# Patient Record
Sex: FEMALE | Race: BLACK OR AFRICAN AMERICAN | NOT HISPANIC OR LATINO | URBAN - METROPOLITAN AREA
[De-identification: names, ages, dates, MRNs, and addresses within clinical notes are randomized per-mention and may not be internally consistent; named-entity substitution may affect disease eponyms.]

---

## 2022-05-11 ENCOUNTER — INPATIENT (INPATIENT)
Facility: HOSPITAL | Age: 82
LOS: 1 days | Discharge: HOME | End: 2022-05-13
Attending: STUDENT IN AN ORGANIZED HEALTH CARE EDUCATION/TRAINING PROGRAM | Admitting: STUDENT IN AN ORGANIZED HEALTH CARE EDUCATION/TRAINING PROGRAM
Payer: MEDICARE

## 2022-05-11 VITALS
RESPIRATION RATE: 20 BRPM | SYSTOLIC BLOOD PRESSURE: 118 MMHG | DIASTOLIC BLOOD PRESSURE: 67 MMHG | OXYGEN SATURATION: 97 % | HEART RATE: 107 BPM | TEMPERATURE: 98 F

## 2022-05-11 DIAGNOSIS — R55 SYNCOPE AND COLLAPSE: ICD-10-CM

## 2022-05-11 DIAGNOSIS — Z78.9 OTHER SPECIFIED HEALTH STATUS: Chronic | ICD-10-CM

## 2022-05-11 LAB
ALBUMIN SERPL ELPH-MCNC: 3.9 G/DL — SIGNIFICANT CHANGE UP (ref 3.3–5)
ALP SERPL-CCNC: 109 U/L — SIGNIFICANT CHANGE UP (ref 40–120)
ALT FLD-CCNC: 7 U/L — SIGNIFICANT CHANGE UP (ref 4–33)
ANION GAP SERPL CALC-SCNC: 13 MMOL/L — SIGNIFICANT CHANGE UP (ref 7–14)
AST SERPL-CCNC: 19 U/L — SIGNIFICANT CHANGE UP (ref 4–32)
B PERT DNA SPEC QL NAA+PROBE: SIGNIFICANT CHANGE UP
B PERT+PARAPERT DNA PNL SPEC NAA+PROBE: SIGNIFICANT CHANGE UP
BASE EXCESS BLDV CALC-SCNC: -0.8 MMOL/L — SIGNIFICANT CHANGE UP (ref -2–3)
BASE EXCESS BLDV CALC-SCNC: 0.6 MMOL/L — SIGNIFICANT CHANGE UP (ref -2–3)
BASOPHILS # BLD AUTO: 0.03 K/UL — SIGNIFICANT CHANGE UP (ref 0–0.2)
BASOPHILS NFR BLD AUTO: 0.5 % — SIGNIFICANT CHANGE UP (ref 0–2)
BILIRUB SERPL-MCNC: <0.2 MG/DL — SIGNIFICANT CHANGE UP (ref 0.2–1.2)
BLD GP AB SCN SERPL QL: NEGATIVE — SIGNIFICANT CHANGE UP
BLOOD GAS VENOUS COMPREHENSIVE RESULT: SIGNIFICANT CHANGE UP
BLOOD GAS VENOUS COMPREHENSIVE RESULT: SIGNIFICANT CHANGE UP
BORDETELLA PARAPERTUSSIS (RAPRVP): SIGNIFICANT CHANGE UP
BUN SERPL-MCNC: 25 MG/DL — HIGH (ref 7–23)
C PNEUM DNA SPEC QL NAA+PROBE: SIGNIFICANT CHANGE UP
CALCIUM SERPL-MCNC: 9.3 MG/DL — SIGNIFICANT CHANGE UP (ref 8.4–10.5)
CHLORIDE BLDV-SCNC: 105 MMOL/L — SIGNIFICANT CHANGE UP (ref 96–108)
CHLORIDE BLDV-SCNC: 108 MMOL/L — SIGNIFICANT CHANGE UP (ref 96–108)
CHLORIDE SERPL-SCNC: 105 MMOL/L — SIGNIFICANT CHANGE UP (ref 98–107)
CO2 BLDV-SCNC: 27.3 MMOL/L — HIGH (ref 22–26)
CO2 BLDV-SCNC: 29.4 MMOL/L — HIGH (ref 22–26)
CO2 SERPL-SCNC: 25 MMOL/L — SIGNIFICANT CHANGE UP (ref 22–31)
CREAT SERPL-MCNC: 1.54 MG/DL — HIGH (ref 0.5–1.3)
EGFR: 33 ML/MIN/1.73M2 — LOW
EOSINOPHIL # BLD AUTO: 0.18 K/UL — SIGNIFICANT CHANGE UP (ref 0–0.5)
EOSINOPHIL NFR BLD AUTO: 3.1 % — SIGNIFICANT CHANGE UP (ref 0–6)
FLUAV SUBTYP SPEC NAA+PROBE: SIGNIFICANT CHANGE UP
FLUBV RNA SPEC QL NAA+PROBE: SIGNIFICANT CHANGE UP
GAS PNL BLDV: 140 MMOL/L — SIGNIFICANT CHANGE UP (ref 136–145)
GAS PNL BLDV: 141 MMOL/L — SIGNIFICANT CHANGE UP (ref 136–145)
GAS PNL BLDV: SIGNIFICANT CHANGE UP
GLUCOSE BLDV-MCNC: 143 MG/DL — HIGH (ref 70–99)
GLUCOSE BLDV-MCNC: 162 MG/DL — HIGH (ref 70–99)
GLUCOSE SERPL-MCNC: 160 MG/DL — HIGH (ref 70–99)
HADV DNA SPEC QL NAA+PROBE: SIGNIFICANT CHANGE UP
HCO3 BLDV-SCNC: 26 MMOL/L — SIGNIFICANT CHANGE UP (ref 22–29)
HCO3 BLDV-SCNC: 28 MMOL/L — SIGNIFICANT CHANGE UP (ref 22–29)
HCOV 229E RNA SPEC QL NAA+PROBE: SIGNIFICANT CHANGE UP
HCOV HKU1 RNA SPEC QL NAA+PROBE: SIGNIFICANT CHANGE UP
HCOV NL63 RNA SPEC QL NAA+PROBE: SIGNIFICANT CHANGE UP
HCOV OC43 RNA SPEC QL NAA+PROBE: SIGNIFICANT CHANGE UP
HCT VFR BLD CALC: 37.3 % — SIGNIFICANT CHANGE UP (ref 34.5–45)
HCT VFR BLDA CALC: 34 % — LOW (ref 34.5–46.5)
HCT VFR BLDA CALC: 35 % — SIGNIFICANT CHANGE UP (ref 34.5–46.5)
HGB BLD CALC-MCNC: 11.4 G/DL — LOW (ref 11.5–15.5)
HGB BLD CALC-MCNC: 11.5 G/DL — SIGNIFICANT CHANGE UP (ref 11.5–15.5)
HGB BLD-MCNC: 11.3 G/DL — LOW (ref 11.5–15.5)
HMPV RNA SPEC QL NAA+PROBE: SIGNIFICANT CHANGE UP
HPIV1 RNA SPEC QL NAA+PROBE: SIGNIFICANT CHANGE UP
HPIV2 RNA SPEC QL NAA+PROBE: SIGNIFICANT CHANGE UP
HPIV3 RNA SPEC QL NAA+PROBE: SIGNIFICANT CHANGE UP
HPIV4 RNA SPEC QL NAA+PROBE: SIGNIFICANT CHANGE UP
IANC: 3.72 K/UL — SIGNIFICANT CHANGE UP (ref 1.8–7.4)
IMM GRANULOCYTES NFR BLD AUTO: 0.7 % — SIGNIFICANT CHANGE UP (ref 0–1.5)
LACTATE BLDV-MCNC: 2.6 MMOL/L — HIGH (ref 0.5–2)
LACTATE BLDV-MCNC: 4.4 MMOL/L — CRITICAL HIGH (ref 0.5–2)
LYMPHOCYTES # BLD AUTO: 1.62 K/UL — SIGNIFICANT CHANGE UP (ref 1–3.3)
LYMPHOCYTES # BLD AUTO: 27.5 % — SIGNIFICANT CHANGE UP (ref 13–44)
M PNEUMO DNA SPEC QL NAA+PROBE: SIGNIFICANT CHANGE UP
MAGNESIUM SERPL-MCNC: 2.1 MG/DL — SIGNIFICANT CHANGE UP (ref 1.6–2.6)
MCHC RBC-ENTMCNC: 22.1 PG — LOW (ref 27–34)
MCHC RBC-ENTMCNC: 30.3 GM/DL — LOW (ref 32–36)
MCV RBC AUTO: 72.9 FL — LOW (ref 80–100)
MONOCYTES # BLD AUTO: 0.3 K/UL — SIGNIFICANT CHANGE UP (ref 0–0.9)
MONOCYTES NFR BLD AUTO: 5.1 % — SIGNIFICANT CHANGE UP (ref 2–14)
NEUTROPHILS # BLD AUTO: 3.72 K/UL — SIGNIFICANT CHANGE UP (ref 1.8–7.4)
NEUTROPHILS NFR BLD AUTO: 63.1 % — SIGNIFICANT CHANGE UP (ref 43–77)
NRBC # BLD: 0 /100 WBCS — SIGNIFICANT CHANGE UP
NRBC # FLD: 0 K/UL — SIGNIFICANT CHANGE UP
PCO2 BLDV: 50 MMHG — HIGH (ref 39–42)
PCO2 BLDV: 55 MMHG — HIGH (ref 39–42)
PH BLDV: 7.31 — LOW (ref 7.32–7.43)
PH BLDV: 7.32 — SIGNIFICANT CHANGE UP (ref 7.32–7.43)
PLATELET # BLD AUTO: 301 K/UL — SIGNIFICANT CHANGE UP (ref 150–400)
PO2 BLDV: 29 MMHG — SIGNIFICANT CHANGE UP
PO2 BLDV: 29 MMHG — SIGNIFICANT CHANGE UP
POTASSIUM BLDV-SCNC: 3.6 MMOL/L — SIGNIFICANT CHANGE UP (ref 3.5–5.1)
POTASSIUM BLDV-SCNC: 3.8 MMOL/L — SIGNIFICANT CHANGE UP (ref 3.5–5.1)
POTASSIUM SERPL-MCNC: 4.6 MMOL/L — SIGNIFICANT CHANGE UP (ref 3.5–5.3)
POTASSIUM SERPL-SCNC: 4.6 MMOL/L — SIGNIFICANT CHANGE UP (ref 3.5–5.3)
PROT SERPL-MCNC: 6.6 G/DL — SIGNIFICANT CHANGE UP (ref 6–8.3)
RAPID RVP RESULT: SIGNIFICANT CHANGE UP
RBC # BLD: 5.12 M/UL — SIGNIFICANT CHANGE UP (ref 3.8–5.2)
RBC # FLD: 15.8 % — HIGH (ref 10.3–14.5)
RH IG SCN BLD-IMP: POSITIVE — SIGNIFICANT CHANGE UP
RSV RNA SPEC QL NAA+PROBE: SIGNIFICANT CHANGE UP
RV+EV RNA SPEC QL NAA+PROBE: SIGNIFICANT CHANGE UP
SAO2 % BLDV: 46.1 % — SIGNIFICANT CHANGE UP
SAO2 % BLDV: 48.6 % — SIGNIFICANT CHANGE UP
SARS-COV-2 RNA SPEC QL NAA+PROBE: SIGNIFICANT CHANGE UP
SODIUM SERPL-SCNC: 143 MMOL/L — SIGNIFICANT CHANGE UP (ref 135–145)
TROPONIN T, HIGH SENSITIVITY RESULT: 28 NG/L — SIGNIFICANT CHANGE UP
TROPONIN T, HIGH SENSITIVITY RESULT: 31 NG/L — SIGNIFICANT CHANGE UP
WBC # BLD: 5.89 K/UL — SIGNIFICANT CHANGE UP (ref 3.8–10.5)
WBC # FLD AUTO: 5.89 K/UL — SIGNIFICANT CHANGE UP (ref 3.8–10.5)

## 2022-05-11 PROCEDURE — 71045 X-RAY EXAM CHEST 1 VIEW: CPT | Mod: 26

## 2022-05-11 PROCEDURE — 99285 EMERGENCY DEPT VISIT HI MDM: CPT | Mod: FS

## 2022-05-11 RX ORDER — BIMATOPROST 0.3 MG/ML
1 SOLUTION/ DROPS OPHTHALMIC
Qty: 10 | Refills: 0
Start: 2022-05-11

## 2022-05-11 RX ORDER — RAMIPRIL 5 MG
1 CAPSULE ORAL
Qty: 30 | Refills: 0
Start: 2022-05-11

## 2022-05-11 RX ORDER — FERROUS SULFATE 325(65) MG
1 TABLET ORAL
Qty: 30 | Refills: 0
Start: 2022-05-11

## 2022-05-11 RX ORDER — HEPARIN SODIUM 5000 [USP'U]/ML
5000 INJECTION INTRAVENOUS; SUBCUTANEOUS EVERY 8 HOURS
Refills: 0 | Status: DISCONTINUED | OUTPATIENT
Start: 2022-05-11 | End: 2022-05-12

## 2022-05-11 RX ORDER — SODIUM CHLORIDE 9 MG/ML
1000 INJECTION INTRAMUSCULAR; INTRAVENOUS; SUBCUTANEOUS ONCE
Refills: 0 | Status: COMPLETED | OUTPATIENT
Start: 2022-05-11 | End: 2022-05-11

## 2022-05-11 RX ORDER — ACETAMINOPHEN 500 MG
650 TABLET ORAL EVERY 6 HOURS
Refills: 0 | Status: DISCONTINUED | OUTPATIENT
Start: 2022-05-11 | End: 2022-05-13

## 2022-05-11 RX ORDER — ALLOPURINOL 300 MG
1 TABLET ORAL
Qty: 30 | Refills: 0
Start: 2022-05-11

## 2022-05-11 RX ORDER — PIPERACILLIN AND TAZOBACTAM 4; .5 G/20ML; G/20ML
3.38 INJECTION, POWDER, LYOPHILIZED, FOR SOLUTION INTRAVENOUS ONCE
Refills: 0 | Status: COMPLETED | OUTPATIENT
Start: 2022-05-11 | End: 2022-05-11

## 2022-05-11 RX ORDER — AZITHROMYCIN 500 MG/1
500 TABLET, FILM COATED ORAL ONCE
Refills: 0 | Status: DISCONTINUED | OUTPATIENT
Start: 2022-05-11 | End: 2022-05-11

## 2022-05-11 RX ORDER — LANOLIN ALCOHOL/MO/W.PET/CERES
3 CREAM (GRAM) TOPICAL AT BEDTIME
Refills: 0 | Status: DISCONTINUED | OUTPATIENT
Start: 2022-05-11 | End: 2022-05-13

## 2022-05-11 RX ORDER — AMLODIPINE BESYLATE 2.5 MG/1
1 TABLET ORAL
Qty: 30 | Refills: 0
Start: 2022-05-11

## 2022-05-11 RX ORDER — ONDANSETRON 8 MG/1
4 TABLET, FILM COATED ORAL EVERY 8 HOURS
Refills: 0 | Status: DISCONTINUED | OUTPATIENT
Start: 2022-05-11 | End: 2022-05-13

## 2022-05-11 RX ORDER — PREGABALIN 225 MG/1
3 CAPSULE ORAL
Qty: 30 | Refills: 0
Start: 2022-05-11

## 2022-05-11 RX ORDER — BRIMONIDINE TARTRATE, TIMOLOL MALEATE 2; 5 MG/ML; MG/ML
2 SOLUTION/ DROPS OPHTHALMIC
Qty: 10 | Refills: 0
Start: 2022-05-11

## 2022-05-11 RX ORDER — CEFTRIAXONE 500 MG/1
1000 INJECTION, POWDER, FOR SOLUTION INTRAMUSCULAR; INTRAVENOUS ONCE
Refills: 0 | Status: DISCONTINUED | OUTPATIENT
Start: 2022-05-11 | End: 2022-05-11

## 2022-05-11 RX ORDER — TRIAMTERENE/HYDROCHLOROTHIAZID 75 MG-50MG
1 TABLET ORAL
Qty: 30 | Refills: 0
Start: 2022-05-11

## 2022-05-11 RX ORDER — CHOLECALCIFEROL (VITAMIN D3) 125 MCG
1 CAPSULE ORAL
Qty: 30 | Refills: 0
Start: 2022-05-11

## 2022-05-11 RX ORDER — ONDANSETRON 8 MG/1
4 TABLET, FILM COATED ORAL ONCE
Refills: 0 | Status: COMPLETED | OUTPATIENT
Start: 2022-05-11 | End: 2022-05-11

## 2022-05-11 RX ADMIN — ONDANSETRON 4 MILLIGRAM(S): 8 TABLET, FILM COATED ORAL at 14:29

## 2022-05-11 RX ADMIN — HEPARIN SODIUM 5000 UNIT(S): 5000 INJECTION INTRAVENOUS; SUBCUTANEOUS at 23:41

## 2022-05-11 RX ADMIN — SODIUM CHLORIDE 1000 MILLILITER(S): 9 INJECTION INTRAMUSCULAR; INTRAVENOUS; SUBCUTANEOUS at 14:30

## 2022-05-11 RX ADMIN — PIPERACILLIN AND TAZOBACTAM 200 GRAM(S): 4; .5 INJECTION, POWDER, LYOPHILIZED, FOR SOLUTION INTRAVENOUS at 18:56

## 2022-05-11 NOTE — ED ADULT NURSE REASSESSMENT NOTE - NS ED NURSE REASSESS COMMENT FT1
Report given to ESS[facility] 1 RN. pt in no apparent distress, offered no complains. pt moved to essu 1. vs as noted.

## 2022-05-11 NOTE — H&P ADULT - PROBLEM SELECTOR PLAN 1
-witnessed syncopal episode while sitting down on afternoon of 5/11, lasting several seconds; eyes reportedly rolled up, and patient was initially lethargic after nausea/vomiting before returning to baseline after a few minutes  -low suspicion for vasovagal syncope given lack of prodromal symptom or precipitating events  -f/u orthostatics  -ECG only notable for LBBB, which has been known since 2018  -c/w telemetry   -f/u TTE to r/o structural etiology  -f/u CTH (consider MRI if CT inconclusive) to to r/o neurovascular etiology    -f/u vEEG to r/o seizure  -f/u neuro consult   -TSH wnl  -f/u CK, lactate, prolactin, repeat trop  -f/u UA -witnessed syncopal episode while sitting down on afternoon of 5/11, lasting several seconds; eyes reportedly rolled up, and patient was initially lethargic after nausea/vomiting before returning to baseline after a few minutes  -low suspicion for vasovagal syncope given lack of prodromal symptom or precipitating events  -f/u orthostatics  -ECG only notable for LBBB, which has been known since 2018  -c/w telemetry   -f/u TTE to r/o structural etiology  -f/u CTH (consider MRI if CT inconclusive) to to r/o neurovascular etiology    -f/u vEEG to r/o seizure  -f/u neuro consult   -TSH wnl; f/u CK, lactate, prolactin, repeat trop  -f/u UA -witnessed syncopal episode while sitting down on afternoon of 5/11, lasting several seconds; eyes reportedly rolled up, and patient was initially lethargic after nausea/vomiting before returning to baseline after a few minutes  -low suspicion for vasovagal syncope given lack of prodromal symptom or precipitating events  -f/u orthostatics  -ECG only notable for LBBB, which has been known since 2018  -c/w telemetry   -f/u TTE to r/o structural etiology  -f/u CTH (consider MRI if CT inconclusive) to to r/o neurovascular etiology    -f/u vEEG to r/o seizure  -neuro consulted for seizure-like activity, told to reconsult in AM given the lack of urgency in the description of the patient's symptoms    -TSH wnl; f/u CK, lactate, prolactin, repeat trop  -f/u UA

## 2022-05-11 NOTE — H&P ADULT - NSHPREVIEWOFSYSTEMS_GEN_ALL_CORE
Review of Systems:  Constitutional: no fever, chills, diaphoresis, or weight loss  HEENT: no headache, blurry vision, eye pain, hearing loss, tinnitus, or ear pain  CV: no chest pain or palpitations   Pulm: chronic non-productive cough  GI: nausea/vomiting; no abdominal pain, diarrhea, constipation, or hematochezia  : no dysuria, hematuria, frequency, or retention  MSK: chronic b/l knee pain  Skin: no rash or pruritis   Neuro: syncope; no headache or weakness

## 2022-05-11 NOTE — ED PROVIDER NOTE - ATTENDING APP SHARED VISIT CONTRIBUTION OF CARE
DR. GARCIA, ATTENDING MD-  I personally saw the patient with the PA and performed a substantive portion of the visit including all aspects of the medical decision making.    82 y/o female h/o htn ckd3 with syncope, n/v, sob, cp.  Syncopized at [facility].  Had n/v thereafter.  No prior h/o syncope.  Obtain ekg cbc cmp trop cxr admit for further care and evaluation.

## 2022-05-11 NOTE — H&P ADULT - HISTORY OF PRESENT ILLNESS
The patient is an 81 year old woman w/ PMHx of HTN, CKD III, glaucoma, OA, and gout p/w syncope. Per the patient's daughter, the patient was asymptomatic until this early afternoon, when while sitting at a beauty salon "getting her hair done," she experienced a witnessed syncopal episode. The patient's eyes reportedly rolled back, and she was unresponsive for a few seconds before vomiting NBNB emesis with SOB and returning to her baseline mental status (A&Ox2). The patient was initially lethargic following the event, but recovered to her baseline shortly thereafter. The patient's daughter subsequently presented the patient to the ED, where the patient complained of throat and abdominal discomfort and had another episode of nausea/vomiting. Currently, the patient reports feeling well and only notes a chronic non-productive cough. Denies any chest pain, fever, chills, abdominal pain, diarrhea, dysuria, headache, dizziness, weakness, recent travel, or recent sick contacts. The patient is an 81 year old woman w/ PMHx of HTN, CKD III, glaucoma, OA, and gout p/w syncope. Per the patient's daughter, the patient was asymptomatic until this early afternoon, when while sitting at a beauty salon "getting her hair done," she experienced a witnessed syncopal episode. The patient's eyes reportedly rolled back, and she was unresponsive for a few seconds before vomiting NBNB emesis with SOB and returning to her baseline mental status (A&Ox2). The patient was initially lethargic following the event, but recovered to her baseline shortly thereafter. The patient's daughter subsequently presented the patient to the ED, where the patient complained of throat and abdominal discomfort and had another episode of nausea/vomiting. Currently, the patient reports feeling, only noting a chronic non-productive cough and chronic b/l knee pain. Denies any chest pain, fever, chills, abdominal pain, diarrhea, dysuria, headache, dizziness, weakness, recent travel, or recent sick contacts.    ED course: afebrile, vital signs wnl other than initial tachycardia , since resolved. WBC wnl, RVP/COVID negative. ECG unremarkable other than LBBB (known by patient and family since 2018). Trop 28, repeat 31. CXR showed possible aspiration pna. Lactate 2.6, repeat 4.4. S/p IVF NS 1L bolus, azithromycin x1, ceftriaxone x1, and zosyn x1. S/p Zofran 4mg for nausea/vomiting. Admitted for further syncope workup and management of aspiration pna.  The patient is an 81 year old woman w/ PMHx of HTN, CKD III, glaucoma, OA, and gout p/w syncope. Per the patient's daughter, the patient was asymptomatic until this early afternoon, when while sitting at a beauty salon "getting her hair done," she experienced a witnessed syncopal episode. The patient's eyes reportedly rolled back, and she was unresponsive for a few seconds before vomiting NBNB emesis and mucous with SOB and returning to her baseline mental status (A&Ox2). The patient was initially lethargic following the event, but recovered to her baseline shortly thereafter. The patient's daughter subsequently presented the patient to the ED, where the patient complained of throat and abdominal discomfort and had another episode of nausea/vomiting. Currently, the patient reports feeling, only noting a chronic non-productive cough and chronic b/l knee pain. Denies any chest pain, fever, chills, abdominal pain, diarrhea, dysuria, headache, dizziness, weakness, recent travel, or recent sick contacts.    ED course: afebrile, vital signs wnl other than initial tachycardia , since resolved. WBC wnl, RVP/COVID negative. ECG unremarkable other than LBBB (known by patient and family since 2018). Trop 28, repeat 31. CXR showed possible aspiration pna. Lactate 2.6, repeat 4.4. S/p IVF NS 1L bolus, azithromycin x1, ceftriaxone x1, and zosyn x1. S/p Zofran 4mg for nausea/vomiting. Admitted for further syncope workup and management of aspiration pna.

## 2022-05-11 NOTE — H&P ADULT - PROBLEM SELECTOR PLAN 5
-hgb 11.3 on admission, MCV 72.9  -known hx of DAVINA, c/w home iron supplementation   -f/u iron studies

## 2022-05-11 NOTE — ED PROVIDER NOTE - NSICDXPASTMEDICALHX_GEN_ALL_CORE_FT
PAST MEDICAL HISTORY:  Glaucoma     Gout     HTN (hypertension)     Osteoarthrosis     Stage 3 chronic kidney disease

## 2022-05-11 NOTE — PATIENT PROFILE ADULT - FALL HARM RISK - RISK INTERVENTIONS

## 2022-05-11 NOTE — H&P ADULT - NSHPLABSRESULTS_GEN_ALL_CORE
LABS:                         11.3   5.89  )-----------( 301      ( 11 May 2022 14:38 )             37.3     05-11    143  |  105  |  25<H>  ----------------------------<  160<H>  4.6   |  25  |  1.54<H>    Ca    9.3      11 May 2022 14:38  Mg     2.10     05-11    TPro  6.6  /  Alb  3.9  /  TBili  <0.2  /  DBili  x   /  AST  19  /  ALT  7   /  AlkPhos  109  05-11      RADIOLOGY, EKG & ADDITIONAL TESTS: Reviewed. LABS:                         11.3   5.89  )-----------( 301      ( 11 May 2022 14:38 )             37.3     05-11    143  |  105  |  25<H>  ----------------------------<  160<H>  4.6   |  25  |  1.54<H>    Ca    9.3      11 May 2022 14:38  Mg     2.10     05-11    TPro  6.6  /  Alb  3.9  /  TBili  <0.2  /  DBili  x   /  AST  19  /  ALT  7   /  AlkPhos  109  05-11      RADIOLOGY, EKG & ADDITIONAL TESTS: Reviewed.    ACC: [id number] EXAM:  XR CHEST PORTABLE URGENT 1V                          PROCEDURE DATE:  05/11/2022      INTERPRETATION:  CLINICAL INFORMATION: Shortness of breath.    EXAM: 1 view of the chest.    COMPARISON: None.    FINDINGS:  Mild bilateral patchy opacities with lower lung predominance might   represent aspiration pneumonia versus crowded vasculature.    No pleural effusion or pneumothorax.  The cardiomediastinal silhouette is poorly evaluated on this projection.  No acute osseous findings.    IMPRESSION: Slightly increased markings at the lung bases (aspiration   pneumonia?) Relatively low index of suspicion.

## 2022-05-11 NOTE — ED ADULT TRIAGE NOTE - CHIEF COMPLAINT QUOTE
Pt at hair salon and became lethargic and began vomiting.  C/O CP with SOB and cough.  BP 92/50 in the field.

## 2022-05-11 NOTE — H&P ADULT - PROBLEM SELECTOR PLAN 4
-SCr 1.54 on admission; per patient's daughter, SCr elevated at baseline, most recently 1.73 on 1/25/22  -patient denies any  symptoms at this time  -continue to monitor

## 2022-05-11 NOTE — ED PROVIDER NOTE - OBJECTIVE STATEMENT
82 y/o F pmh HTN, CKD stage 3, glaucoma p/w an episode of syncope. Daughter at bedside providing additional history. Pt was sitting in a chair getting her hair done when her eyes rolled back and became unresponsive for a few seconds. When pt came to, she vomited. mental state at baseline after syncope. Pt currently c/o throat and abd discomfort. Pt reports mild sob. has a cough today. Denies chest pain, hx of syncope, fever, chills, diarrhea, dysuria.

## 2022-05-11 NOTE — ED PROVIDER NOTE - NS ED ATTENDING STATEMENT MOD
This was a shared visit with the HAIR. I reviewed and verified the documentation and independently performed the documented:

## 2022-05-11 NOTE — ED ADULT NURSE NOTE - INTERVENTIONS DEFINITIONS
[place] to call system/Call bell, personal items and telephone within reach/Instruct patient to call for assistance/Non-slip footwear when patient is off stretcher/Physically safe environment: no spills, clutter or unnecessary equipment/Stretcher in lowest position, wheels locked, appropriate side rails in place/Monitor gait and stability/Monitor for mental status changes and reorient to person, place, and time/Reinforce activity limits and safety measures with patient and family

## 2022-05-11 NOTE — H&P ADULT - NSHPPHYSICALEXAM_GEN_ALL_CORE
VITALS:   T(C): 36.7 (05-11-22 @ 21:26), Max: 36.7 (05-11-22 @ 13:00)  HR: 76 (05-11-22 @ 21:26) (76 - 107)  BP: 117/71 (05-11-22 @ 21:26) (106/77 - 118/67)  RR: 18 (05-11-22 @ 21:26) (18 - 20)  SpO2: 100% (05-11-22 @ 21:26) (97% - 100%)    PHYSICAL EXAM:   General: well-developed, elderly woman in NAD; awake, lying in bed comfortably  HEENT: nc/at, clear conjunctiva, moist mucous membranes  Neck: supple, no JVD  Heart: RRR; no murmurs, rubs, or gallops  Chest/Lung: bibasilar crackles; no wheezes or rhonchi  Abdomen: soft, obese, non-tender; no guarding or rebound; bowel sounds present  Extremities: no edema, erythema, or tenderness to palpation  Skin: clear, dry  Neuro: A&Ox2 (at baseline per family); no focal deficits, grossly intact

## 2022-05-11 NOTE — H&P ADULT - NSHPSOCIALHISTORY_GEN_ALL_CORE
Patient lives with her daughter in a private residence. Denies smoking, alcohol abuse, or drug use. Ambulatory with a cane at baseline. Daughter is RN, son is beginning his 3rd year of medical school.

## 2022-05-11 NOTE — ED PROVIDER NOTE - CLINICAL SUMMARY MEDICAL DECISION MAKING FREE TEXT BOX
82 y/o F pmh HTN, CKD stage 3, glaucoma p/w an episode of syncope. Daughter at bedside providing additional history. Pt was sitting in a chair getting her hair done when her eyes rolled back and became unresponsive for a few seconds. When pt came to, she vomited. mental state at baseline after syncope. Pt currently c/o throat and abd discomfort. Pt reports mild sob. has a cough today. Denies chest pain, hx of syncope, fever, chills, diarrhea, dysuria.  syncope  ekg shows LBBB  -labs, cxr, tele

## 2022-05-11 NOTE — H&P ADULT - ATTENDING COMMENTS
81 year old woman w/ PMHx of HTN, CKD III, glaucoma, OA, and gout p/w witnessed syncopal episode. Currently, patient is at her baseline, denies any new complaints. On exam, patient is in NAD, nontoxic appearing, cranial nerve 2-12 grossly intact, strength 5/5 in all extremities. Labs are notable for mild anemia, PAOLA on ?CKD, lactic acidosis. EKG showed no new changes. CXR showed possible aspiration PNA. She was given zosyn in the ED, despite penicillin reported allergy during childhood.     #Syncope   -unclear if seizure or syncope   -neurology consulted by the resident. F/u with CT head to r/o any acute stroke or bleeding  -F/u with vEEG   -Orthostatics   -F/u with TTE   -C/w telemonitoring   -F/u with TSH, CK, lactate, prolactin, repeat trop      #Aspiration PNA   -CXR showed possible RLL opacity, concerning for aspiration in the setting of vomiting   -C/w unasyn for now  -F/u with procalcitonin   -monitor off oxygen for now     #Penicillin allergy   -probably not a true allergy   -Will continue change it to unasyn 81 year old woman w/ PMHx of HTN, CKD III, glaucoma, OA, and gout p/w witnessed syncopal episode. Currently, patient is at her baseline, denies any new complaints. On exam, patient is in NAD, nontoxic appearing, cranial nerve 2-12 grossly intact, strength 5/5 in all extremities, +systolic murmur at L sternal border. Labs are notable for mild anemia, PAOLA on ?CKD, lactic acidosis. EKG showed no new changes. CXR showed possible aspiration PNA. She was given zosyn in the ED, despite penicillin reported allergy during childhood.     #Syncope   -unclear if seizure or syncope   -neurology consulted by the resident. F/u with CT head to r/o any acute stroke or bleeding  -F/u with vEEG   -Orthostatics   -F/u with TTE   -C/w telemonitoring   -F/u with TSH, CK, lactate, prolactin, repeat trop      #Aspiration PNA   -CXR showed possible RLL opacity, concerning for aspiration in the setting of vomiting   -C/w unasyn for now  -F/u with procalcitonin   -monitor off oxygen for now     #Penicillin allergy   -probably not a true allergy   -Will continue change it to unasyn

## 2022-05-11 NOTE — ED ADULT NURSE NOTE - OBJECTIVE STATEMENT
pt received in rm 12 AAO x 2 to self and place (per family this is pts baseline). collateral obtained from pts daughter. as per pts daughter pt was getting her hair done at the salon and became lethargic and vomited. pt c/o throat pain, abdominal pain and sharp pain under left breast. pt noted to cough up yellow sputum. pt denies chest pain, diarrhea, fevers, chills. respirations even and unlabored. pt placed on 2L NC for comfort. 20g iv placed to left ac. pt placed on cardiac monitor. NSR. awaiting MD orders. will continue to monitor.

## 2022-05-11 NOTE — H&P ADULT - PROBLEM SELECTOR PLAN 2
-CXR 5/12 showed increased markings at the lung bases, possibly 2/2 aspiration pna  -patient is afebrile w/ WBC wnl; negative SIRS  -s/p azithromycin, ceftriaxone, zosyn in ED 5/11  -c/w unasyn 5/12-  -f/u urine legionella  -f/u procalcitonin level

## 2022-05-11 NOTE — H&P ADULT - PROBLEM SELECTOR PLAN 6
-c/w home amlodipine   -will hold home ramipril and triamterene-hctz at this time i/s/o elevated SCr

## 2022-05-11 NOTE — H&P ADULT - ASSESSMENT
81 year old woman w/ PMHx of HTN, CKD III, glaucoma, OA, and gout p/w witnessed syncopal episode earlier today on 5/11, resolved after a few seconds but associated with nausea/vomiting. In the ED, afebrile, vital signs wnl other than initial tachycardia , since resolved. WBC wnl, RVP/COVID negative. ECG unremarkable other than LBBB (known by patient and family since 2018). Trop 28, repeat 31. CXR showed possible aspiration pna. Lactate 2.6, repeat 4.4. S/p IVF NS 1L bolus, azithromycin x1, ceftriaxone x1, and zosyn x1. S/p Zofran 4mg for nausea/vomiting. Admitted for further syncope workup and management of aspiration pna.

## 2022-05-11 NOTE — H&P ADULT - PROBLEM SELECTOR PLAN 3
-lactate 2.6 on admission, increased to 4.4 s/p IVF NS 1L in ED  -etiology unclear, no anion gap and bicarb wnl  -s/p 500cc LR upon admission  -continue to monitor serum lactate

## 2022-05-12 DIAGNOSIS — M10.9 GOUT, UNSPECIFIED: ICD-10-CM

## 2022-05-12 DIAGNOSIS — D50.9 IRON DEFICIENCY ANEMIA, UNSPECIFIED: ICD-10-CM

## 2022-05-12 DIAGNOSIS — R79.89 OTHER SPECIFIED ABNORMAL FINDINGS OF BLOOD CHEMISTRY: ICD-10-CM

## 2022-05-12 DIAGNOSIS — N18.30 CHRONIC KIDNEY DISEASE, STAGE 3 UNSPECIFIED: ICD-10-CM

## 2022-05-12 DIAGNOSIS — Z29.9 ENCOUNTER FOR PROPHYLACTIC MEASURES, UNSPECIFIED: ICD-10-CM

## 2022-05-12 DIAGNOSIS — I10 ESSENTIAL (PRIMARY) HYPERTENSION: ICD-10-CM

## 2022-05-12 DIAGNOSIS — J69.0 PNEUMONITIS DUE TO INHALATION OF FOOD AND VOMIT: ICD-10-CM

## 2022-05-12 DIAGNOSIS — H40.9 UNSPECIFIED GLAUCOMA: ICD-10-CM

## 2022-05-12 DIAGNOSIS — R55 SYNCOPE AND COLLAPSE: ICD-10-CM

## 2022-05-12 LAB
ALBUMIN SERPL ELPH-MCNC: 3.3 G/DL — SIGNIFICANT CHANGE UP (ref 3.3–5)
ALP SERPL-CCNC: 86 U/L — SIGNIFICANT CHANGE UP (ref 40–120)
ALT FLD-CCNC: 6 U/L — SIGNIFICANT CHANGE UP (ref 4–33)
ANION GAP SERPL CALC-SCNC: 16 MMOL/L — HIGH (ref 7–14)
APPEARANCE UR: ABNORMAL
AST SERPL-CCNC: 12 U/L — SIGNIFICANT CHANGE UP (ref 4–32)
BACTERIA # UR AUTO: ABNORMAL
BASE EXCESS BLDV CALC-SCNC: -2 MMOL/L — SIGNIFICANT CHANGE UP (ref -2–3)
BILIRUB SERPL-MCNC: 0.2 MG/DL — SIGNIFICANT CHANGE UP (ref 0.2–1.2)
BILIRUB UR-MCNC: ABNORMAL
BLOOD GAS VENOUS COMPREHENSIVE RESULT: SIGNIFICANT CHANGE UP
BUN SERPL-MCNC: 29 MG/DL — HIGH (ref 7–23)
CALCIUM SERPL-MCNC: 9.1 MG/DL — SIGNIFICANT CHANGE UP (ref 8.4–10.5)
CHLORIDE BLDV-SCNC: 106 MMOL/L — SIGNIFICANT CHANGE UP (ref 96–108)
CHLORIDE SERPL-SCNC: 105 MMOL/L — SIGNIFICANT CHANGE UP (ref 98–107)
CK SERPL-CCNC: 77 U/L — SIGNIFICANT CHANGE UP (ref 25–170)
CK SERPL-CCNC: 82 U/L — SIGNIFICANT CHANGE UP (ref 25–170)
CO2 BLDV-SCNC: 23.3 MMOL/L — SIGNIFICANT CHANGE UP (ref 22–26)
CO2 SERPL-SCNC: 19 MMOL/L — LOW (ref 22–31)
COLOR SPEC: YELLOW — SIGNIFICANT CHANGE UP
CREAT SERPL-MCNC: 1.52 MG/DL — HIGH (ref 0.5–1.3)
DIFF PNL FLD: NEGATIVE — SIGNIFICANT CHANGE UP
EGFR: 34 ML/MIN/1.73M2 — LOW
EPI CELLS # UR: >27 /HPF — HIGH (ref 0–5)
FERRITIN SERPL-MCNC: 676 NG/ML — HIGH (ref 15–150)
GAS PNL BLDV: 133 MMOL/L — LOW (ref 136–145)
GLUCOSE BLDV-MCNC: 121 MG/DL — HIGH (ref 70–99)
GLUCOSE SERPL-MCNC: 119 MG/DL — HIGH (ref 70–99)
GLUCOSE UR QL: ABNORMAL
HCO3 BLDV-SCNC: 22 MMOL/L — SIGNIFICANT CHANGE UP (ref 22–29)
HCT VFR BLD CALC: 34.4 % — LOW (ref 34.5–45)
HCT VFR BLDA CALC: 32 % — LOW (ref 34.5–46.5)
HGB BLD CALC-MCNC: 10.8 G/DL — LOW (ref 11.5–15.5)
HGB BLD-MCNC: 10.6 G/DL — LOW (ref 11.5–15.5)
HYALINE CASTS # UR AUTO: 2 /LPF — SIGNIFICANT CHANGE UP (ref 0–7)
IRON SATN MFR SERPL: 12 UG/DL — LOW (ref 30–160)
IRON SATN MFR SERPL: 7 % — LOW (ref 14–50)
KETONES UR-MCNC: ABNORMAL
LACTATE BLDV-MCNC: 3.4 MMOL/L — HIGH (ref 0.5–2)
LEUKOCYTE ESTERASE UR-ACNC: NEGATIVE — SIGNIFICANT CHANGE UP
MAGNESIUM SERPL-MCNC: 1.9 MG/DL — SIGNIFICANT CHANGE UP (ref 1.6–2.6)
MCHC RBC-ENTMCNC: 22.5 PG — LOW (ref 27–34)
MCHC RBC-ENTMCNC: 30.8 GM/DL — LOW (ref 32–36)
MCV RBC AUTO: 72.9 FL — LOW (ref 80–100)
NITRITE UR-MCNC: POSITIVE
NRBC # BLD: 0 /100 WBCS — SIGNIFICANT CHANGE UP
NRBC # FLD: 0 K/UL — SIGNIFICANT CHANGE UP
PCO2 BLDV: 35 MMHG — LOW (ref 39–42)
PH BLDV: 7.41 — SIGNIFICANT CHANGE UP (ref 7.32–7.43)
PH UR: 6.5 — SIGNIFICANT CHANGE UP (ref 5–8)
PHOSPHATE SERPL-MCNC: 3.1 MG/DL — SIGNIFICANT CHANGE UP (ref 2.5–4.5)
PLATELET # BLD AUTO: 233 K/UL — SIGNIFICANT CHANGE UP (ref 150–400)
PO2 BLDV: 188 MMHG — SIGNIFICANT CHANGE UP
POTASSIUM BLDV-SCNC: 4.2 MMOL/L — SIGNIFICANT CHANGE UP (ref 3.5–5.1)
POTASSIUM SERPL-MCNC: 4.1 MMOL/L — SIGNIFICANT CHANGE UP (ref 3.5–5.3)
POTASSIUM SERPL-SCNC: 4.1 MMOL/L — SIGNIFICANT CHANGE UP (ref 3.5–5.3)
PROCALCITONIN SERPL-MCNC: 30.25 NG/ML — HIGH (ref 0.02–0.1)
PROLACTIN SERPL-MCNC: 12.8 NG/ML — SIGNIFICANT CHANGE UP (ref 3.4–24.1)
PROT SERPL-MCNC: 6.1 G/DL — SIGNIFICANT CHANGE UP (ref 6–8.3)
PROT UR-MCNC: ABNORMAL
RBC # BLD: 4.72 M/UL — SIGNIFICANT CHANGE UP (ref 3.8–5.2)
RBC # FLD: 15.7 % — HIGH (ref 10.3–14.5)
RBC CASTS # UR COMP ASSIST: 8 /HPF — HIGH (ref 0–4)
SAO2 % BLDV: 99.5 % — SIGNIFICANT CHANGE UP
SODIUM SERPL-SCNC: 140 MMOL/L — SIGNIFICANT CHANGE UP (ref 135–145)
SP GR SPEC: >1.05 (ref 1–1.05)
TIBC SERPL-MCNC: 168 UG/DL — LOW (ref 220–430)
TROPONIN T, HIGH SENSITIVITY RESULT: 30 NG/L — SIGNIFICANT CHANGE UP
TSH SERPL-MCNC: 0.88 UIU/ML — SIGNIFICANT CHANGE UP (ref 0.27–4.2)
TSH SERPL-MCNC: 2.13 UIU/ML — SIGNIFICANT CHANGE UP (ref 0.27–4.2)
UIBC SERPL-MCNC: 156 UG/DL — SIGNIFICANT CHANGE UP (ref 110–370)
UROBILINOGEN FLD QL: ABNORMAL
WBC # BLD: 12.41 K/UL — HIGH (ref 3.8–10.5)
WBC # FLD AUTO: 12.41 K/UL — HIGH (ref 3.8–10.5)
WBC UR QL: 3 /HPF — SIGNIFICANT CHANGE UP (ref 0–5)

## 2022-05-12 PROCEDURE — 93306 TTE W/DOPPLER COMPLETE: CPT | Mod: 26

## 2022-05-12 PROCEDURE — 70450 CT HEAD/BRAIN W/O DYE: CPT | Mod: 26

## 2022-05-12 PROCEDURE — 12345: CPT | Mod: NC

## 2022-05-12 PROCEDURE — 99223 1ST HOSP IP/OBS HIGH 75: CPT

## 2022-05-12 RX ORDER — PIPERACILLIN AND TAZOBACTAM 4; .5 G/20ML; G/20ML
3.38 INJECTION, POWDER, LYOPHILIZED, FOR SOLUTION INTRAVENOUS EVERY 8 HOURS
Refills: 0 | Status: DISCONTINUED | OUTPATIENT
Start: 2022-05-12 | End: 2022-05-13

## 2022-05-12 RX ORDER — ALLOPURINOL 300 MG
100 TABLET ORAL DAILY
Refills: 0 | Status: DISCONTINUED | OUTPATIENT
Start: 2022-05-12 | End: 2022-05-13

## 2022-05-12 RX ORDER — PREGABALIN 225 MG/1
3 CAPSULE ORAL
Qty: 0 | Refills: 0 | DISCHARGE

## 2022-05-12 RX ORDER — BIMATOPROST 0.3 MG/ML
1 SOLUTION/ DROPS OPHTHALMIC
Qty: 0 | Refills: 0 | DISCHARGE

## 2022-05-12 RX ORDER — SODIUM CHLORIDE 9 MG/ML
500 INJECTION, SOLUTION INTRAVENOUS ONCE
Refills: 0 | Status: COMPLETED | OUTPATIENT
Start: 2022-05-12 | End: 2022-05-12

## 2022-05-12 RX ORDER — AMPICILLIN SODIUM AND SULBACTAM SODIUM 250; 125 MG/ML; MG/ML
INJECTION, POWDER, FOR SUSPENSION INTRAMUSCULAR; INTRAVENOUS
Refills: 0 | Status: DISCONTINUED | OUTPATIENT
Start: 2022-05-12 | End: 2022-05-12

## 2022-05-12 RX ORDER — LATANOPROST 0.05 MG/ML
1 SOLUTION/ DROPS OPHTHALMIC; TOPICAL AT BEDTIME
Refills: 0 | Status: DISCONTINUED | OUTPATIENT
Start: 2022-05-12 | End: 2022-05-13

## 2022-05-12 RX ORDER — BRIMONIDINE TARTRATE, TIMOLOL MALEATE 2; 5 MG/ML; MG/ML
1 SOLUTION/ DROPS OPHTHALMIC
Qty: 0 | Refills: 0 | DISCHARGE

## 2022-05-12 RX ORDER — TIMOLOL 0.5 %
1 DROPS OPHTHALMIC (EYE)
Refills: 0 | Status: DISCONTINUED | OUTPATIENT
Start: 2022-05-12 | End: 2022-05-13

## 2022-05-12 RX ORDER — AMLODIPINE BESYLATE 2.5 MG/1
10 TABLET ORAL DAILY
Refills: 0 | Status: DISCONTINUED | OUTPATIENT
Start: 2022-05-12 | End: 2022-05-13

## 2022-05-12 RX ORDER — FERROUS SULFATE 325(65) MG
325 TABLET ORAL DAILY
Refills: 0 | Status: DISCONTINUED | OUTPATIENT
Start: 2022-05-12 | End: 2022-05-13

## 2022-05-12 RX ORDER — ALLOPURINOL 300 MG
1 TABLET ORAL
Qty: 0 | Refills: 0 | DISCHARGE

## 2022-05-12 RX ORDER — HEPARIN SODIUM 5000 [USP'U]/ML
5000 INJECTION INTRAVENOUS; SUBCUTANEOUS EVERY 8 HOURS
Refills: 0 | Status: DISCONTINUED | OUTPATIENT
Start: 2022-05-12 | End: 2022-05-13

## 2022-05-12 RX ORDER — FERROUS SULFATE 325(65) MG
1 TABLET ORAL
Qty: 0 | Refills: 0 | DISCHARGE

## 2022-05-12 RX ORDER — AMPICILLIN SODIUM AND SULBACTAM SODIUM 250; 125 MG/ML; MG/ML
1.5 INJECTION, POWDER, FOR SUSPENSION INTRAMUSCULAR; INTRAVENOUS EVERY 6 HOURS
Refills: 0 | Status: DISCONTINUED | OUTPATIENT
Start: 2022-05-12 | End: 2022-05-12

## 2022-05-12 RX ORDER — AMLODIPINE BESYLATE 2.5 MG/1
1 TABLET ORAL
Qty: 0 | Refills: 0 | DISCHARGE

## 2022-05-12 RX ORDER — AMPICILLIN SODIUM AND SULBACTAM SODIUM 250; 125 MG/ML; MG/ML
1.5 INJECTION, POWDER, FOR SUSPENSION INTRAMUSCULAR; INTRAVENOUS ONCE
Refills: 0 | Status: COMPLETED | OUTPATIENT
Start: 2022-05-12 | End: 2022-05-12

## 2022-05-12 RX ORDER — BRIMONIDINE TARTRATE 2 MG/MG
1 SOLUTION/ DROPS OPHTHALMIC
Refills: 0 | Status: DISCONTINUED | OUTPATIENT
Start: 2022-05-12 | End: 2022-05-13

## 2022-05-12 RX ADMIN — SODIUM CHLORIDE 500 MILLILITER(S): 9 INJECTION, SOLUTION INTRAVENOUS at 01:45

## 2022-05-12 RX ADMIN — BRIMONIDINE TARTRATE 1 DROP(S): 2 SOLUTION/ DROPS OPHTHALMIC at 17:15

## 2022-05-12 RX ADMIN — Medication 325 MILLIGRAM(S): at 17:14

## 2022-05-12 RX ADMIN — LATANOPROST 1 DROP(S): 0.05 SOLUTION/ DROPS OPHTHALMIC; TOPICAL at 21:15

## 2022-05-12 RX ADMIN — AMPICILLIN SODIUM AND SULBACTAM SODIUM 100 GRAM(S): 250; 125 INJECTION, POWDER, FOR SUSPENSION INTRAMUSCULAR; INTRAVENOUS at 16:15

## 2022-05-12 RX ADMIN — AMLODIPINE BESYLATE 10 MILLIGRAM(S): 2.5 TABLET ORAL at 05:40

## 2022-05-12 RX ADMIN — PIPERACILLIN AND TAZOBACTAM 25 GRAM(S): 4; .5 INJECTION, POWDER, LYOPHILIZED, FOR SOLUTION INTRAVENOUS at 21:16

## 2022-05-12 RX ADMIN — HEPARIN SODIUM 5000 UNIT(S): 5000 INJECTION INTRAVENOUS; SUBCUTANEOUS at 17:14

## 2022-05-12 RX ADMIN — AMPICILLIN SODIUM AND SULBACTAM SODIUM 100 GRAM(S): 250; 125 INJECTION, POWDER, FOR SUSPENSION INTRAMUSCULAR; INTRAVENOUS at 02:42

## 2022-05-12 RX ADMIN — Medication 1 DROP(S): at 17:14

## 2022-05-12 RX ADMIN — Medication 100 MILLIGRAM(S): at 17:14

## 2022-05-12 RX ADMIN — AMPICILLIN SODIUM AND SULBACTAM SODIUM 100 GRAM(S): 250; 125 INJECTION, POWDER, FOR SUSPENSION INTRAMUSCULAR; INTRAVENOUS at 10:44

## 2022-05-12 NOTE — PROVIDER CONTACT NOTE (OTHER) - BACKGROUND
(PMH) Osteoarthrosis  (PMH) Glaucoma  (PMH) Stage 3 chronic kidney disease  (PMH) HTN (hypertension)  (Principal DC/DX) Syncope

## 2022-05-12 NOTE — PROVIDER CONTACT NOTE (OTHER) - SITUATION
patient's orthostatics as follows   lying - 123/65 HR 81   sitting - 141/73 HR 99   standing - 121/67

## 2022-05-12 NOTE — PROGRESS NOTE ADULT - PROBLEM SELECTOR PLAN 2
-CXR 5/12 showed increased markings at the lung bases, possibly 2/2 aspiration pna  -patient is afebrile w/ WBC wnl; negative SIRS  -s/p azithromycin, ceftriaxone, zosyn in ED 5/11  -c/w unasyn 5/12-  -f/u urine legionella

## 2022-05-12 NOTE — PROGRESS NOTE ADULT - PROBLEM SELECTOR PLAN 1
-witnessed syncopal episode while sitting down on afternoon of 5/11, lasting several seconds; eyes reportedly rolled up, and patient was initially lethargic after nausea/vomiting before returning to baseline after a few minutes  -orthostatics+--> encourage PO hydration as pt with mod AS  -ECG only notable for LBBB, which has been known since 2018  -TTE w/moderate AS, mild LV global dysfunction   -CTH w/moderate severity chronic white matter microvascular type changes  -f/u vEEG to r/o seizure  -f/u neuro   -TSH wnl  -f/u UA  -Treat asp PNA

## 2022-05-12 NOTE — PROVIDER CONTACT NOTE (OTHER) - RECOMMENDATIONS
Spoke to them    Eduard Carrington PA-C  [facility]      continue to monitor vitals and on tele

## 2022-05-12 NOTE — PROGRESS NOTE ADULT - SUBJECTIVE AND OBJECTIVE BOX
[facility] Division of Hospital Medicine  Christie FloresDO  Pager (M-F, 0E-5P): [phone]      Patient is a 81y old  Female who presents with a chief complaint of Syncope (11 May 2022 21:01)      SUBJECTIVE / OVERNIGHT EVENTS: no overnight events   ADDITIONAL REVIEW OF SYSTEMS:    MEDICATIONS  (STANDING):  allopurinol 100 milliGRAM(s) Oral daily  amLODIPine   Tablet 10 milliGRAM(s) Oral daily  ampicillin/sulbactam  IVPB      ampicillin/sulbactam  IVPB 1.5 Gram(s) IV Intermittent every 6 hours  brimonidine 0.2% Ophthalmic Solution 1 Drop(s) Both EYES two times a day  ferrous    sulfate 325 milliGRAM(s) Oral daily  heparin   Injectable 5000 Unit(s) SubCutaneous every 8 hours  latanoprost 0.005% Ophthalmic Solution 1 Drop(s) Both EYES at bedtime  timolol 0.5% Solution 1 Drop(s) Both EYES two times a day    MEDICATIONS  (PRN):  acetaminophen     Tablet .. 650 milliGRAM(s) Oral every 6 hours PRN Temp greater or equal to 38C (100.4F), Mild Pain (1 - 3)  aluminum hydroxide/magnesium hydroxide/simethicone Suspension 30 milliLiter(s) Oral every 4 hours PRN Dyspepsia  melatonin 3 milliGRAM(s) Oral at bedtime PRN Insomnia  ondansetron Injectable 4 milliGRAM(s) IV Push every 8 hours PRN Nausea and/or Vomiting      CAPILLARY BLOOD GLUCOSE        I&O's Summary      PHYSICAL EXAM:  Vital Signs Last 24 Hrs  T(C): 36.7 (12 May 2022 05:13), Max: 37.1 (11 May 2022 21:40)  T(F): 98.1 (12 May 2022 05:13), Max: 98.7 (11 May 2022 21:40)  HR: 83 (12 May 2022 05:13) (76 - 107)  BP: 140/60 (12 May 2022 05:13) (106/77 - 140/60)  BP(mean): --  RR: 18 (12 May 2022 05:13) (18 - 20)  SpO2: 100% (12 May 2022 05:13) (97% - 100%)  CONSTITUTIONAL: NAD, well-developed, well-groomed  EYES: EOMI; conjunctiva and sclera clear  ENMT: Moist oral mucosa, no pharyngeal injection or exudates; normal dentition  NECK: Supple, no palpable masses; no thyromegaly  RESPIRATORY: Normal respiratory effort; lungs are clear to auscultation bilaterally  CARDIOVASCULAR: Regular rate and rhythm, normal S1 and S2, no murmur/rub/gallop; No lower extremity edema; Peripheral pulses are 2+ bilaterally  ABDOMEN: Nontender to palpation, normoactive bowel sounds, no rebound/guarding; No hepatosplenomegaly  MUSKULOSKELETAL:  no clubbing or cyanosis of digits; no joint swelling or tenderness to palpation  PSYCH: A+O to person, place, and time; affect appropriate  NEUROLOGY: CN 2-12 are intact and symmetric; no gross sensory deficits;   SKIN: No rashes; no palpable lesions    LABS:                        10.6   12.41 )-----------( 233      ( 12 May 2022 07:15 )             34.4     05-12    140  |  105  |  29<H>  ----------------------------<  119<H>  4.1   |  19<L>  |  1.52<H>    Ca    9.1      12 May 2022 07:15  Phos  3.1     05-12  Mg     1.90     05-12    TPro  6.1  /  Alb  3.3  /  TBili  0.2  /  DBili  x   /  AST  12  /  ALT  6   /  AlkPhos  86  05-12      CARDIAC MARKERS ( 12 May 2022 07:15 )  x     / x     / 77 U/L / x     / x      CARDIAC MARKERS ( 11 May 2022 16:15 )  x     / x     / 82 U/L / x     / x                RADIOLOGY & ADDITIONAL TESTS:  Results Reviewed:   Imaging Personally Reviewed:  Electrocardiogram Personally Reviewed:    COORDINATION OF CARE:  Care Discussed with Consultants/Other Providers [Y/N]: Y  Prior or Outpatient Records Reviewed [Y/N]: Y   [facility] Division of Hospital Medicine  Terriedavidrivas FloresDO  Pager (M-F, 8A-5P): [phone]      Patient is a 81y old  Female who presents with a chief complaint of Syncope (11 May 2022 21:01)      SUBJECTIVE / OVERNIGHT EVENTS: Pt seen at bedside, mild cough, otherwise feels well, alert and oriented to self and place. Dgt who is a RN is at bedside, states mother is looking much better and vomiting has subsided.   ADDITIONAL REVIEW OF SYSTEMS: +cough, no cp/sob     MEDICATIONS  (STANDING):  allopurinol 100 milliGRAM(s) Oral daily  amLODIPine   Tablet 10 milliGRAM(s) Oral daily  ampicillin/sulbactam  IVPB      ampicillin/sulbactam  IVPB 1.5 Gram(s) IV Intermittent every 6 hours  brimonidine 0.2% Ophthalmic Solution 1 Drop(s) Both EYES two times a day  ferrous    sulfate 325 milliGRAM(s) Oral daily  heparin   Injectable 5000 Unit(s) SubCutaneous every 8 hours  latanoprost 0.005% Ophthalmic Solution 1 Drop(s) Both EYES at bedtime  timolol 0.5% Solution 1 Drop(s) Both EYES two times a day    MEDICATIONS  (PRN):  acetaminophen     Tablet .. 650 milliGRAM(s) Oral every 6 hours PRN Temp greater or equal to 38C (100.4F), Mild Pain (1 - 3)  aluminum hydroxide/magnesium hydroxide/simethicone Suspension 30 milliLiter(s) Oral every 4 hours PRN Dyspepsia  melatonin 3 milliGRAM(s) Oral at bedtime PRN Insomnia  ondansetron Injectable 4 milliGRAM(s) IV Push every 8 hours PRN Nausea and/or Vomiting      CAPILLARY BLOOD GLUCOSE        I&O's Summary      PHYSICAL EXAM:  Vital Signs Last 24 Hrs  T(C): 36.7 (12 May 2022 05:13), Max: 37.1 (11 May 2022 21:40)  T(F): 98.1 (12 May 2022 05:13), Max: 98.7 (11 May 2022 21:40)  HR: 83 (12 May 2022 05:13) (76 - 107)  BP: 140/60 (12 May 2022 05:13) (106/77 - 140/60)  BP(mean): --  RR: 18 (12 May 2022 05:13) (18 - 20)  SpO2: 100% (12 May 2022 05:13) (97% - 100%)  PHYSICAL EXAM:   General: elderly woman in NAD; awake, lying in bed comfortably  HEENT: EOMI, moist mucous membranes  Neck: supple, no JVD  Heart: RRR; no murmurs, rubs, or gallops  Chest/Lung: bibasilar crackles; no wheezes or rhonchi  Abdomen: soft, obese, non-tender; no guarding or rebound; bowel sounds present  Extremities: no edema, erythema, or tenderness to palpation  Skin: clear, dry  Neuro: A&Ox2 (at baseline per family); no focal deficits, grossly intact    LABS:                        10.6   12.41 )-----------( 233      ( 12 May 2022 07:15 )             34.4     05-12    140  |  105  |  29<H>  ----------------------------<  119<H>  4.1   |  19<L>  |  1.52<H>    Ca    9.1      12 May 2022 07:15  Phos  3.1     05-12  Mg     1.90     05-12    TPro  6.1  /  Alb  3.3  /  TBili  0.2  /  DBili  x   /  AST  12  /  ALT  6   /  AlkPhos  86  05-12      CARDIAC MARKERS ( 12 May 2022 07:15 )  x     / x     / 77 U/L / x     / x      CARDIAC MARKERS ( 11 May 2022 16:15 )  x     / x     / 82 U/L / x     / x                RADIOLOGY & ADDITIONAL TESTS:  < from: Transthoracic Echocardiogram (05.12.22 @ 09:14) >  CONCLUSIONS:  1. Mitral annular calcification, otherwise normal mitral  valve. Mild mitral regurgitation.  2. Aortic valve leaflet morphology not well visualized.  The valve is calcified. Peak transaortic valve gradient  equals 54 mm Hg, mean transaortic valve gradient equals 34  mm Hg, consistent with at least moderate aortic stenosis.  However, unable to accurately estimate the aortic valve  area. Moderate aortic regurgitation.  3. Mildly dilated left atrium.  LA volume index = 41 cc/m2.  4. Mild global left ventricular systolic dysfunction.  5. Mild diastolic dysfunction (Stage I).  6. Normal right ventricular size and function.  Consider BENIGNO for further evaluation of the aortic valve, if  clinically indicated.  ------------------------------------------------------------------------  Confirmed on  5/12/2022 - 10:23:01 by Mac Andrea M.D.,  [facility], FASE  ------------------------------------------------------------------------    < end of copied text >  < from: CT Head No Cont (05.12.22 @ 00:25) >  IMPRESSION: No acute intracranial hemorrhage, mass effect, or shift of   the midline structures.    Moderate severity chronic white matter microvascular type changes.    --- End of Report ---            RASTA CUEVAS MD; Attending Radiologist  This document has been electronically signed. May 12 2022  7:56AM    < end of copied text >      COORDINATION OF CARE:  Care Discussed with Consultants/Other Providers [Y/N]: Y  Prior or Outpatient Records Reviewed [Y/N]: JENISE

## 2022-05-12 NOTE — PHYSICAL THERAPY INITIAL EVALUATION ADULT - PERTINENT HX OF CURRENT PROBLEM, REHAB EVAL
Patient is 81 year old woman w/ PMHx of HTN, CKD III, glaucoma, OA, and gout present with witnessed syncopal episode

## 2022-05-13 ENCOUNTER — TRANSCRIPTION ENCOUNTER (OUTPATIENT)
Age: 82
End: 2022-05-13

## 2022-05-13 VITALS
OXYGEN SATURATION: 98 % | RESPIRATION RATE: 18 BRPM | HEART RATE: 68 BPM | TEMPERATURE: 98 F | DIASTOLIC BLOOD PRESSURE: 72 MMHG | SYSTOLIC BLOOD PRESSURE: 122 MMHG

## 2022-05-13 PROCEDURE — 99239 HOSP IP/OBS DSCHRG MGMT >30: CPT

## 2022-05-13 RX ORDER — TRIAMTERENE/HYDROCHLOROTHIAZID 75 MG-50MG
1 TABLET ORAL
Qty: 0 | Refills: 0 | DISCHARGE

## 2022-05-13 RX ORDER — RAMIPRIL 5 MG
1 CAPSULE ORAL
Qty: 0 | Refills: 0 | DISCHARGE

## 2022-05-13 RX ORDER — CHOLECALCIFEROL (VITAMIN D3) 125 MCG
1 CAPSULE ORAL
Qty: 0 | Refills: 0 | DISCHARGE

## 2022-05-13 RX ADMIN — Medication 325 MILLIGRAM(S): at 12:24

## 2022-05-13 RX ADMIN — PIPERACILLIN AND TAZOBACTAM 25 GRAM(S): 4; .5 INJECTION, POWDER, LYOPHILIZED, FOR SOLUTION INTRAVENOUS at 05:34

## 2022-05-13 RX ADMIN — AMLODIPINE BESYLATE 10 MILLIGRAM(S): 2.5 TABLET ORAL at 05:28

## 2022-05-13 RX ADMIN — PIPERACILLIN AND TAZOBACTAM 25 GRAM(S): 4; .5 INJECTION, POWDER, LYOPHILIZED, FOR SOLUTION INTRAVENOUS at 12:21

## 2022-05-13 RX ADMIN — Medication 100 MILLIGRAM(S): at 12:24

## 2022-05-13 RX ADMIN — BRIMONIDINE TARTRATE 1 DROP(S): 2 SOLUTION/ DROPS OPHTHALMIC at 05:28

## 2022-05-13 RX ADMIN — HEPARIN SODIUM 5000 UNIT(S): 5000 INJECTION INTRAVENOUS; SUBCUTANEOUS at 00:24

## 2022-05-13 RX ADMIN — Medication 1 DROP(S): at 05:29

## 2022-05-13 RX ADMIN — HEPARIN SODIUM 5000 UNIT(S): 5000 INJECTION INTRAVENOUS; SUBCUTANEOUS at 12:22

## 2022-05-13 NOTE — DISCHARGE NOTE NURSING/CASE MANAGEMENT/SOCIAL WORK - PATIENT PORTAL LINK FT
You can access the FollowMyHealth Patient Portal offered by [facility] by registering at the following website: [email or web address]. By joining [employer]’s FollowMyHealth portal, you will also be able to view your health information using other applications (apps) compatible with our system.

## 2022-05-13 NOTE — PROGRESS NOTE ADULT - PROBLEM SELECTOR PLAN 5
Parent(s)
-hgb 11.3 on admission, MCV 72.9  -known hx of DAVINA, c/w home iron supplementation   -f/u iron studies
-hgb 11.3 on admission, MCV 72.9  -known hx of DAVINA, c/w home iron supplementation   -f/u iron studies

## 2022-05-13 NOTE — DISCHARGE NOTE PROVIDER - HOSPITAL COURSE
81F w/ PMHx of HTN, CKD III, glaucoma, OA, and gout p/w witnessed syncopal episode earlier today on 5/11, resolved after a few seconds but associated with nausea/vomiting. CXR showed possible aspiration pna c/b lactic acidosis        Syncope:  -witnessed syncopal episode while sitting down on afternoon of 5/11, lasting several seconds; eyes reportedly rolled up, and patient was initially lethargic after nausea/vomiting before returning to baseline after a few minutes  -orthostatics+--> encouraged PO hydration, orthostasis has subsided   -ECG only notable for LBBB, which has been known since 2018  -TTE w/moderate AS, mild LV global dysfunction   -CTH w/moderate severity chronic white matter microvascular type changes  -d/w neuro, no need for EEG, unlikely neurogenic   -TSH wnl  -Treat asp PNA and possible UTI with zosyn will transition to Augmentin on discharge     Aspiration pneumonia:  -CXR 5/12 showed increased markings at the lung bases, possibly 2/2 aspiration pna  -patient is afebrile w/ WBC wnl; negative SIRS  -s/p azithromycin, ceftriaxone, zosyn transitioned to Augmentin     Elevated lactic acid level:   -lactate 2.6 on admission, increased to 4.4 s/p IVF NS 1L in ED with lactate peaking   -Pt mentating, warm and well perfused, can stop trending.    Stage 3 chronic kidney disease:  -SCr 1.54 on admission; per patient's daughter, SCr elevated at baseline, most recently 1.73 on 1/25/22  -patient denies any  symptoms at this time  - [place] follow up with PCP within 1-2 weeks after d/c    Microcytic anemia:  -hgb 11.3 on admission, MCV 72.9  -known hx of DAVINA, c/w home iron supplementation     HTN (hypertension):  -c/w home amlodipine   -will hold home ramipril and triamterene-hctz at this time i/s/o elevated SCr, pt will follow up with PCP about restarting medications     Glaucoma:   -c/w home lumigan and combigan.    Gout:   -c/w home allopurinol    Prophylactic measure:   -DVT ppx: SQH  -Diet: regular  -Code status: full code    PT recommends home w/home PT.   81F w/ PMHx of HTN, CKD III, glaucoma, OA, and gout p/w witnessed syncopal episode earlier today on 5/11, resolved after a few seconds but associated with nausea/vomiting. CXR showed possible aspiration pna c/b lactic acidosis        Syncope:  -witnessed syncopal episode while sitting down on afternoon of 5/11, lasting several seconds; eyes reportedly rolled up, and patient was initially lethargic after nausea/vomiting before returning to baseline after a few minutes  -orthostatics+--> encouraged PO hydration, orthostasis has subsided   -ECG only notable for LBBB, which has been known since 2018  -TTE w/moderate AS, mild LV global dysfunction   -CTH w/moderate severity chronic white matter microvascular type changes  -d/w neuro, no need for EEG, unlikely neurogenic   -TSH wnl  -Treat asp PNA and possible UTI with zosyn will transition to Augmentin for five days on discharge     Aspiration pneumonia:  -CXR 5/12 showed increased markings at the lung bases, possibly 2/2 aspiration pna  -patient is afebrile w/ WBC wnl; negative SIRS  -s/p azithromycin, ceftriaxone, zosyn transitioned to Augmentin     Elevated lactic acid level:   -lactate 2.6 on admission, increased to 4.4 s/p IVF NS 1L in ED with lactate peaking   -Pt mentating, warm and well perfused, can stop trending.    Stage 3 chronic kidney disease:  -SCr 1.54 on admission; per patient's daughter, SCr elevated at baseline, most recently 1.73 on 1/25/22  -patient denies any  symptoms at this time  - [place] follow up with PCP within 1-2 weeks after d/c    Microcytic anemia:  -hgb 11.3 on admission, MCV 72.9  -known hx of DAVINA, c/w home iron supplementation     HTN (hypertension):  -c/w home amlodipine   -will hold home ramipril and triamterene-hctz at this time i/s/o elevated SCr, pt will follow up with PCP about restarting medications     Glaucoma:   -c/w home lumigan and combigan.    Gout:   -c/w home allopurinol    Prophylactic measure:   -DVT ppx: SQH  -Diet: regular  -Code status: full code    PT recommends home w/home PT

## 2022-05-13 NOTE — PROGRESS NOTE ADULT - PROBLEM SELECTOR PLAN 6
-c/w home amlodipine   -will hold home ramipril and triamterene-hctz at this time i/s/o elevated SCr
-c/w home amlodipine   -will hold home ramipril and triamterene-hctz at this time i/s/o elevated SCr

## 2022-05-13 NOTE — PROGRESS NOTE ADULT - SUBJECTIVE AND OBJECTIVE BOX
[facility] Division of Hospital Medicine  Christie Flores   Pager (M-F, 8A-5P): [phone]      Patient is a 81y old  Female who presents with a chief complaint of Syncope (12 May 2022 09:25)      SUBJECTIVE / OVERNIGHT EVENTS: no overnight events   ADDITIONAL REVIEW OF SYSTEMS:    MEDICATIONS  (STANDING):  allopurinol 100 milliGRAM(s) Oral daily  amLODIPine   Tablet 10 milliGRAM(s) Oral daily  brimonidine 0.2% Ophthalmic Solution 1 Drop(s) Both EYES two times a day  ferrous    sulfate 325 milliGRAM(s) Oral daily  heparin   Injectable 5000 Unit(s) SubCutaneous every 8 hours  latanoprost 0.005% Ophthalmic Solution 1 Drop(s) Both EYES at bedtime  piperacillin/tazobactam IVPB.. 3.375 Gram(s) IV Intermittent every 8 hours  timolol 0.5% Solution 1 Drop(s) Both EYES two times a day    MEDICATIONS  (PRN):  acetaminophen     Tablet .. 650 milliGRAM(s) Oral every 6 hours PRN Temp greater or equal to 38C (100.4F), Mild Pain (1 - 3)  aluminum hydroxide/magnesium hydroxide/simethicone Suspension 30 milliLiter(s) Oral every 4 hours PRN Dyspepsia  melatonin 3 milliGRAM(s) Oral at bedtime PRN Insomnia  ondansetron Injectable 4 milliGRAM(s) IV Push every 8 hours PRN Nausea and/or Vomiting      CAPILLARY BLOOD GLUCOSE        I&O's Summary    12 May 2022 07:01  -  13 May 2022 07:00  --------------------------------------------------------  IN: 240 mL / OUT: 500 mL / NET: -260 mL        PHYSICAL EXAM:  Vital Signs Last 24 Hrs  T(C): 36.6 (13 May 2022 05:00), Max: 36.8 (12 May 2022 14:00)  T(F): 97.8 (13 May 2022 05:00), Max: 98.3 (12 May 2022 14:00)  HR: 69 (13 May 2022 05:00) (69 - 85)  BP: 130/67 (13 May 2022 05:00) (124/66 - 135/79)  BP(mean): --  RR: 16 (13 May 2022 05:00) (16 - 18)  SpO2: 100% (13 May 2022 05:00) (100% - 100%)  CONSTITUTIONAL: NAD, well-developed, well-groomed  EYES: EOMI; conjunctiva and sclera clear  ENMT: Moist oral mucosa, no pharyngeal injection or exudates; normal dentition  NECK: Supple, no palpable masses; no thyromegaly  RESPIRATORY: Normal respiratory effort; lungs are clear to auscultation bilaterally  CARDIOVASCULAR: Regular rate and rhythm, normal S1 and S2, no murmur/rub/gallop; No lower extremity edema; Peripheral pulses are 2+ bilaterally  ABDOMEN: Nontender to palpation, normoactive bowel sounds, no rebound/guarding; No hepatosplenomegaly  MUSKULOSKELETAL:  no clubbing or cyanosis of digits; no joint swelling or tenderness to palpation  PSYCH: A+O to person, place, and time; affect appropriate  NEUROLOGY: CN 2-12 are intact and symmetric; no gross sensory deficits;   SKIN: No rashes; no palpable lesions    LABS:                        10.6   12.41 )-----------( 233      ( 12 May 2022 07:15 )             34.4     05-12    140  |  105  |  29<H>  ----------------------------<  119<H>  4.1   |  19<L>  |  1.52<H>    Ca    9.1      12 May 2022 07:15  Phos  3.1     05-12  Mg     1.90     05-12    TPro  6.1  /  Alb  3.3  /  TBili  0.2  /  DBili  x   /  AST  12  /  ALT  6   /  AlkPhos  86  05-12      CARDIAC MARKERS ( 12 May 2022 07:15 )  x     / x     / 77 U/L / x     / x      CARDIAC MARKERS ( 11 May 2022 16:15 )  x     / x     / 82 U/L / x     / x          Urinalysis Basic - ( 12 May 2022 17:25 )    Color: Yellow / Appearance: Slightly Turbid / SG: >1.050 / pH: x  Gluc: x / Ketone: Trace  / Bili: Small / Urobili: 3 mg/dL   Blood: x / Protein: 300 mg/dL / Nitrite: Positive   Leuk Esterase: Negative / RBC: 8 /HPF / WBC 3 /HPF   Sq Epi: x / Non Sq Epi: >27 /HPF / Bacteria: Few          RADIOLOGY & ADDITIONAL TESTS:  Results Reviewed:   Imaging Personally Reviewed:  Electrocardiogram Personally Reviewed:    COORDINATION OF CARE:  Care Discussed with Consultants/Other Providers [Y/N]: Y  Prior or Outpatient Records Reviewed [Y/N]: Y   [facility] Division of Hospital Medicine  Christie Flores DO  Pager (M-F, 8A-5P): [phone]      Patient is a 81y old  Female who presents with a chief complaint of Syncope (12 May 2022 09:25)      SUBJECTIVE / OVERNIGHT EVENTS: Dgt at bedside, Pt feelng well today, mild cough, otherwise no complaints. Wants to go home.   ADDITIONAL REVIEW OF SYSTEMS: no cp/sob     MEDICATIONS  (STANDING):  allopurinol 100 milliGRAM(s) Oral daily  amLODIPine   Tablet 10 milliGRAM(s) Oral daily  brimonidine 0.2% Ophthalmic Solution 1 Drop(s) Both EYES two times a day  ferrous    sulfate 325 milliGRAM(s) Oral daily  heparin   Injectable 5000 Unit(s) SubCutaneous every 8 hours  latanoprost 0.005% Ophthalmic Solution 1 Drop(s) Both EYES at bedtime  piperacillin/tazobactam IVPB.. 3.375 Gram(s) IV Intermittent every 8 hours  timolol 0.5% Solution 1 Drop(s) Both EYES two times a day    MEDICATIONS  (PRN):  acetaminophen     Tablet .. 650 milliGRAM(s) Oral every 6 hours PRN Temp greater or equal to 38C (100.4F), Mild Pain (1 - 3)  aluminum hydroxide/magnesium hydroxide/simethicone Suspension 30 milliLiter(s) Oral every 4 hours PRN Dyspepsia  melatonin 3 milliGRAM(s) Oral at bedtime PRN Insomnia  ondansetron Injectable 4 milliGRAM(s) IV Push every 8 hours PRN Nausea and/or Vomiting      CAPILLARY BLOOD GLUCOSE        I&O's Summary    12 May 2022 07:01  -  13 May 2022 07:00  --------------------------------------------------------  IN: 240 mL / OUT: 500 mL / NET: -260 mL        PHYSICAL EXAM:  Vital Signs Last 24 Hrs  T(C): 36.6 (13 May 2022 05:00), Max: 36.8 (12 May 2022 14:00)  T(F): 97.8 (13 May 2022 05:00), Max: 98.3 (12 May 2022 14:00)  HR: 69 (13 May 2022 05:00) (69 - 85)  BP: 130/67 (13 May 2022 05:00) (124/66 - 135/79)  BP(mean): --  RR: 16 (13 May 2022 05:00) (16 - 18)  SpO2: 100% (13 May 2022 05:00) (100% - 100%)  General: NAD; awake, lying in bed comfortably  HEENT: EOMI, moist mucous membranes  Neck: supple, no JVD  Heart: RRR; no murmurs, rubs, or gallops  Chest/Lung: CTAB; no wheezes or rhonchi  Abdomen: soft, obese, non-tender; no guarding or rebound; bowel sounds present  Extremities: no edema, erythema, or tenderness to palpation  Skin: clear, dry  Neuro: A&Ox2 (at baseline per family); no focal deficits, grossly intact  LABS:                        10.6   12.41 )-----------( 233      ( 12 May 2022 07:15 )             34.4     05-12    140  |  105  |  29<H>  ----------------------------<  119<H>  4.1   |  19<L>  |  1.52<H>    Ca    9.1      12 May 2022 07:15  Phos  3.1     05-12  Mg     1.90     05-12    TPro  6.1  /  Alb  3.3  /  TBili  0.2  /  DBili  x   /  AST  12  /  ALT  6   /  AlkPhos  86  05-12      CARDIAC MARKERS ( 12 May 2022 07:15 )  x     / x     / 77 U/L / x     / x      CARDIAC MARKERS ( 11 May 2022 16:15 )  x     / x     / 82 U/L / x     / x          Urinalysis Basic - ( 12 May 2022 17:25 )    Color: Yellow / Appearance: Slightly Turbid / SG: >1.050 / pH: x  Gluc: x / Ketone: Trace  / Bili: Small / Urobili: 3 mg/dL   Blood: x / Protein: 300 mg/dL / Nitrite: Positive   Leuk Esterase: Negative / RBC: 8 /HPF / WBC 3 /HPF   Sq Epi: x / Non Sq Epi: >27 /HPF / Bacteria: Few          RADIOLOGY & ADDITIONAL TESTS:  Results Reviewed:   Imaging Personally Reviewed:  Electrocardiogram Personally Reviewed:    COORDINATION OF CARE:  Care Discussed with Consultants/Other Providers [Y/N]: Y  Prior or Outpatient Records Reviewed [Y/N]: Y

## 2022-05-13 NOTE — PROGRESS NOTE ADULT - PROBLEM SELECTOR PLAN 9
-DVT ppx: SQH  -Diet: regular  -Code status: full code    PT recommends home w/home PT. -DVT ppx: SQH  -Diet: regular  -Code status: full code    PT recommends home w/home PT. D/w dgt at bedside, agreeable to DC home on PO abx, will hold BP meds and resume as tolerated w/PCP followup.   ~45 minutes

## 2022-05-13 NOTE — DISCHARGE NOTE PROVIDER - NSDCCPCAREPLAN_GEN_ALL_CORE_FT
PRINCIPAL DISCHARGE DIAGNOSIS  Diagnosis: Syncope  Assessment and Plan of Treatment: You came to Hospital for syncope, you had a cat scan of your head wich was negative, you have some orthostatic changes which has resolved.  We Stopped your ACE inhibitor as well as your diuretic, please continune your AMLODIPINE and follow up with your PCP within 1-2 regarding these medications.      SECONDARY DISCHARGE DIAGNOSES  Diagnosis: Elevated lactic acid level  Assessment and Plan of Treatment: You had elevated lactic acid while in the hopsital, this was likely due to dehydration, your hydration status has improved.  Please follow up with your primary doctor for further workup.    Diagnosis: Microcytic anemia  Assessment and Plan of Treatment: You have anemia, please continue your IRON supplement    Diagnosis: HTN (hypertension)  Assessment and Plan of Treatment: We discontinued your diuretic and your ACE inhibitor, please continue your AMLODIPINE and follow up with your primary care doctor    Diagnosis: Glaucoma  Assessment and Plan of Treatment: please continue your eye drops for your glaucome    Diagnosis: Stage 3 chronic kidney disease  Assessment and Plan of Treatment: Please continue to manage your kidney disease with your PCP and your neprhologist    Diagnosis: Aspiration pneumonia  Assessment and Plan of Treatment: You have apsiration pneumonia, this was treated with antibiotics.  You are going home to [facility] and you are to follow up with your primary care doctor within 1-2 weeks.     PRINCIPAL DISCHARGE DIAGNOSIS  Diagnosis: Syncope  Assessment and Plan of Treatment: You came to Hospital for syncope, you had a cat scan of your head which was negative, you have some orthostatic changes which has resolved.  We Stopped your ACE inhibitor as well as your diuretic, please continune your AMLODIPINE and follow up with your PCP within 1-2 regarding these medications.      SECONDARY DISCHARGE DIAGNOSES  Diagnosis: Elevated lactic acid level  Assessment and Plan of Treatment: You had elevated lactic acid while in the hopsital, this was likely due to dehydration, your hydration status has improved.  Please follow up with your primary doctor for further workup.    Diagnosis: Microcytic anemia  Assessment and Plan of Treatment: You have anemia, please continue your IRON supplement    Diagnosis: HTN (hypertension)  Assessment and Plan of Treatment: We discontinued your diuretic and your ACE inhibitor, please continue your AMLODIPINE and follow up with your primary care doctor    Diagnosis: Glaucoma  Assessment and Plan of Treatment: please continue your eye drops for your glaucome    Diagnosis: Stage 3 chronic kidney disease  Assessment and Plan of Treatment: Please continue to manage your kidney disease with your PCP and your neprhologist    Diagnosis: Aspiration pneumonia  Assessment and Plan of Treatment: You have apsiration pneumonia, this was treated with antibiotics.  You are going home to [facility] and you are to follow up with your primary care doctor within 1-2 weeks.

## 2022-05-13 NOTE — PROGRESS NOTE ADULT - PROBLEM SELECTOR PLAN 2
-CXR 5/12 showed increased markings at the lung bases, possibly 2/2 aspiration pna  -patient is afebrile w/ WBC wnl; negative SIRS  -s/p azithromycin, ceftriaxone, zosyn in ED 5/11  -c/w zosyn to cover asp and possible urinary source   -f/u urine legionella -CXR 5/12 showed increased markings at the lung bases, possibly 2/2 aspiration pna  -patient is afebrile w/ WBC wnl; negative SIRS  -s/p azithromycin, ceftriaxone, zosyn in ED 5/11  -Transition zosyn to cover asp and possible urinary source, to Augmentin for total 5 days

## 2022-05-13 NOTE — DISCHARGE NOTE PROVIDER - CARE PROVIDER_API CALL
Everton Vick)  [facility]  [address] [place] [address]  Phone: [phone]  Fax: [phone]  Follow Up Time:

## 2022-05-13 NOTE — PROGRESS NOTE ADULT - PROBLEM SELECTOR PLAN 3
-lactate 2.6 on admission, increased to 4.4 s/p IVF NS 1L in ED  -Pt mentating, warm and well perfused, can stop trending
-lactate 2.6 on admission, increased to 4.4 s/p IVF NS 1L in ED  -Pt mentating, warm and well perfused, can stop trending

## 2022-05-13 NOTE — PROGRESS NOTE ADULT - PROBLEM SELECTOR PLAN 1
-witnessed syncopal episode while sitting down on afternoon of 5/11, lasting several seconds; eyes reportedly rolled up, and patient was initially lethargic after nausea/vomiting before returning to baseline after a few minutes  -orthostatics+--> encourage PO hydration as pt with mod AS  -ECG only notable for LBBB, which has been known since 2018  -TTE w/moderate AS, mild LV global dysfunction   -CTH w/moderate severity chronic white matter microvascular type changes  -d/w neuro, no need for EEG, unlikely neurogenic   -TSH wnl  -Treat asp PNA and possible UTI  -Orthostatic + sitting to standing, encouraged PO intake, repeat orthostatics today -witnessed syncopal episode while sitting down on afternoon of 5/11, lasting several seconds; eyes reportedly rolled up, and patient was initially lethargic after nausea/vomiting before returning to baseline after a few minutes  -orthostatics+--> encourage PO hydration as pt with mod AS  -ECG only notable for LBBB, which has been known since 2018  -TTE w/moderate AS, mild LV global dysfunction   -CTH w/moderate severity chronic white matter microvascular type changes  -d/w neuro, no need for EEG, unlikely neurogenic   -TSH wnl  -Treat asp PNA and possible UTI  -Orthostatic + sitting to standing, encouraged PO intake, repeat negative on 5/13  -DC to home today, will complete course with PO Augmentin for total 5 days. -witnessed syncopal episode while sitting down on afternoon of 5/11, lasting several seconds; eyes reportedly rolled up, and patient was initially lethargic after nausea/vomiting before returning to baseline after a few minutes  -orthostatics+--> encourage PO hydration as pt with mod AS  -ECG only notable for LBBB, which has been known since 2018  -TTE w/moderate AS, mild LV global dysfunction -- d/w dgt, will take pt to cardiologist for repeat TTE in 6 months.   -CTH w/moderate severity chronic white matter microvascular type changes  -d/w neuro, no need for EEG, unlikely neurogenic   -TSH wnl  -Treat asp PNA and possible UTI  -Orthostatic + sitting to standing, encouraged PO intake, repeat negative on 5/13  -DC to home today, will complete course with PO Augmentin for total 5 days.

## 2022-05-13 NOTE — PROGRESS NOTE ADULT - PROBLEM SELECTOR PLAN 4
-SCr 1.54 on admission; per patient's daughter, SCr elevated at baseline, most recently 1.73 on 1/25/22  -patient denies any  symptoms at this time  -continue to monitor
-SCr 1.54 on admission; per patient's daughter, SCr elevated at baseline, most recently 1.73 on 1/25/22  -patient denies any  symptoms at this time  -continue to monitor

## 2022-05-13 NOTE — DISCHARGE NOTE PROVIDER - NSDCMRMEDTOKEN_GEN_ALL_CORE_FT
allopurinol 100 mg oral tablet: 1 tab(s) orally once a day  amLODIPine 10 mg oral tablet: 1 tab(s) orally once a day  Combigan 0.2%-0.5% ophthalmic solution: 1 drop(s) to each affected eye every 12 hours  ferrous sulfate 325 mg (65 mg elemental iron) oral tablet: 1 tab(s) orally once a day  Lumigan 0.01% ophthalmic solution: 1 drop(s) to each affected eye once a day (in the evening)  ramipril 5 mg oral tablet: 1 tab(s) orally once a day  triamterene-hydrochlorothiazide 37.5 mg-25 mg oral tablet: 1 tab(s) orally once a day (except Mondays or Thursdays)  Vitamin B12 1000 mcg oral tablet: 3 tab(s) orally once a day  Vitamin D3 25 mcg (1000 intl units) oral tablet: 1 tab(s) orally once a day   allopurinol 100 mg oral tablet: 1 tab(s) orally once a day  amLODIPine 10 mg oral tablet: 1 tab(s) orally once a day  amoxicillin-clavulanate 875 mg-125 mg oral tablet: 1 tab(s) orally once a day   Combigan 0.2%-0.5% ophthalmic solution: 1 drop(s) to each affected eye every 12 hours  ferrous sulfate 325 mg (65 mg elemental iron) oral tablet: 1 tab(s) orally once a day  Lumigan 0.01% ophthalmic solution: 1 drop(s) to each affected eye once a day (in the evening)  Vitamin B12 1000 mcg oral tablet: 3 tab(s) orally once a day   allopurinol 100 mg oral tablet: 1 tab(s) orally once a day  amLODIPine 10 mg oral tablet: 1 tab(s) orally once a day  amoxicillin-clavulanate 875 mg-125 mg oral tablet: 1 tab(s) orally 2 times a day   Combigan 0.2%-0.5% ophthalmic solution: 1 drop(s) to each affected eye every 12 hours  ferrous sulfate 325 mg (65 mg elemental iron) oral tablet: 1 tab(s) orally once a day  Lumigan 0.01% ophthalmic solution: 1 drop(s) to each affected eye once a day (in the evening)  Vitamin B12 1000 mcg oral tablet: 3 tab(s) orally once a day

## 2022-05-13 NOTE — PROGRESS NOTE ADULT - ASSESSMENT
81F w/ PMHx of HTN, CKD III, glaucoma, OA, and gout p/w witnessed syncopal episode earlier today on 5/11, resolved after a few seconds but associated with nausea/vomiting. CXR showed possible aspiration pna c/b lactic acidosis  
81F w/ PMHx of HTN, CKD III, glaucoma, OA, and gout p/w witnessed syncopal episode earlier today on 5/11, resolved after a few seconds but associated with nausea/vomiting. CXR showed possible aspiration pna c/b lactic acidosis

## 2022-05-13 NOTE — DISCHARGE NOTE PROVIDER - NSDCFUADDINST_GEN_ALL_CORE_FT
Please follow up with your doctors, if your have further symptoms please call your doctors or come to the hospital

## 2022-05-14 LAB
CULTURE RESULTS: NO GROWTH — SIGNIFICANT CHANGE UP
SPECIMEN SOURCE: SIGNIFICANT CHANGE UP

## 2022-05-16 ENCOUNTER — TRANSCRIPTION ENCOUNTER (OUTPATIENT)
Age: 82
End: 2022-05-16

## 2022-05-25 PROBLEM — I10 ESSENTIAL (PRIMARY) HYPERTENSION: Chronic | Status: ACTIVE | Noted: 2022-05-11

## 2022-05-25 PROBLEM — H40.9 UNSPECIFIED GLAUCOMA: Chronic | Status: ACTIVE | Noted: 2022-05-11

## 2022-05-25 PROBLEM — N18.30 CHRONIC KIDNEY DISEASE, STAGE 3 UNSPECIFIED: Chronic | Status: ACTIVE | Noted: 2022-05-11

## 2022-05-25 PROBLEM — M19.90 UNSPECIFIED OSTEOARTHRITIS, UNSPECIFIED SITE: Chronic | Status: ACTIVE | Noted: 2022-05-11

## 2022-05-25 PROBLEM — M10.9 GOUT, UNSPECIFIED: Chronic | Status: ACTIVE | Noted: 2022-05-11

## 2022-06-08 ENCOUNTER — APPOINTMENT (OUTPATIENT)
Dept: NEUROLOGY | Facility: CLINIC | Age: 82
End: 2022-06-08
Payer: MEDICARE

## 2022-06-08 VITALS
SYSTOLIC BLOOD PRESSURE: 127 MMHG | DIASTOLIC BLOOD PRESSURE: 68 MMHG | HEART RATE: 67 BPM | WEIGHT: 167 LBS | BODY MASS INDEX: 29.59 KG/M2 | HEIGHT: 63 IN

## 2022-06-08 DIAGNOSIS — F03.90 UNSPECIFIED DEMENTIA W/OUT BEHAVIORAL DISTURBANCE: ICD-10-CM

## 2022-06-08 DIAGNOSIS — Z87.891 PERSONAL HISTORY OF NICOTINE DEPENDENCE: ICD-10-CM

## 2022-06-08 DIAGNOSIS — M19.90 UNSPECIFIED OSTEOARTHRITIS, UNSPECIFIED SITE: ICD-10-CM

## 2022-06-08 DIAGNOSIS — I10 ESSENTIAL (PRIMARY) HYPERTENSION: ICD-10-CM

## 2022-06-08 DIAGNOSIS — M10.9 GOUT, UNSPECIFIED: ICD-10-CM

## 2022-06-08 DIAGNOSIS — R55 SYNCOPE AND COLLAPSE: ICD-10-CM

## 2022-06-08 PROCEDURE — 99205 OFFICE O/P NEW HI 60 MIN: CPT

## 2022-06-08 RX ORDER — RAMIPRIL 5 MG/1
5 CAPSULE ORAL
Qty: 90 | Refills: 0 | Status: COMPLETED | COMMUNITY
Start: 2022-03-24

## 2022-06-08 RX ORDER — CHOLECALCIFEROL (VITAMIN D3) 25 MCG
25 MCG CAPSULE ORAL
Qty: 30 | Refills: 0 | Status: ACTIVE | COMMUNITY
Start: 2021-11-23

## 2022-06-08 RX ORDER — ALLOPURINOL 100 MG/1
100 TABLET ORAL
Qty: 90 | Refills: 0 | Status: ACTIVE | COMMUNITY
Start: 2021-12-28

## 2022-06-08 RX ORDER — BIMATOPROST 0.1 MG/ML
0.01 SOLUTION/ DROPS OPHTHALMIC
Qty: 8 | Refills: 0 | Status: ACTIVE | COMMUNITY
Start: 2022-05-27

## 2022-06-08 RX ORDER — AMOXICILLIN AND CLAVULANATE POTASSIUM 875; 125 MG/1; MG/1
875-125 TABLET, COATED ORAL
Qty: 10 | Refills: 0 | Status: COMPLETED | COMMUNITY
Start: 2022-05-13

## 2022-06-08 RX ORDER — CHLORHEXIDINE GLUCONATE 4 %
325 (65 FE) LIQUID (ML) TOPICAL
Qty: 90 | Refills: 0 | Status: ACTIVE | COMMUNITY
Start: 2022-03-24

## 2022-06-08 RX ORDER — AMLODIPINE BESYLATE 10 MG/1
10 TABLET ORAL
Qty: 90 | Refills: 0 | Status: ACTIVE | COMMUNITY
Start: 2022-03-24

## 2022-06-08 RX ORDER — BRIMONIDINE TARTRATE, TIMOLOL MALEATE 2; 5 MG/ML; MG/ML
0.2-0.5 SOLUTION/ DROPS OPHTHALMIC
Qty: 30 | Refills: 0 | Status: ACTIVE | COMMUNITY
Start: 2022-05-31

## 2022-06-08 RX ORDER — TRIAMTERENE AND HYDROCHLOROTHIAZIDE 25; 37.5 MG/1; MG/1
37.5-25 TABLET ORAL
Qty: 30 | Refills: 0 | Status: COMPLETED | COMMUNITY
Start: 2021-12-28

## 2022-06-08 RX ORDER — DONEPEZIL HYDROCHLORIDE 5 MG/1
5 TABLET ORAL
Qty: 30 | Refills: 2 | Status: ACTIVE | COMMUNITY
Start: 2022-06-08 | End: 1900-01-01

## 2022-06-08 NOTE — HISTORY OF PRESENT ILLNESS
[FreeTextEntry1] : 82 y/o RH woman hx of glaucoma, CKD III, osteoarthritis, gout recent admission to [facility] 5/11/22 for episode of syncope. Her nephrologist wanted her to follow up. She was at the [facility] and there were strong fumes in the room. Per daughter she suddenly looked back with eye rolled up and then lost consciousness. She aroused a bit and then went back to staring and being unresponsive with hand and arms shaking. Patient doesn't recall what happened or being in the hospital. She has had progressive memory issues since her 60s, but the family started doing her finances in her 70's. She will forget what she needs to do sometimes such as going to the bathroom, dresses and bathes with assistance. At times childlike. She still reads, recognizes her grandchildren, recognizes when going to places, she walks short distances. She uses a wheelchair at times because of arthritis of the knees. She is incontinent of urine for 4 years, using diapers. 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 degree , retired at age 48 because of her arthritis. She likes to watch TV and eating sweets. Likes to play with her grandchildren. She also watches her other daughter who is developmentally delayed. She sleeps  about 20yrs a day. Daughter says it is hard to get her out of bed. She has never been a person to like to go anywhere. She denies any sadness or depression. No feelings of worthlessness or hopelessness. Quite smoking 30 yrs ago, was smoking for about 40yrs. \par \par \gorge Has had COVID booster March 14th .

## 2022-06-08 NOTE — DATA REVIEWED
[de-identified] : CT head 5/12/22\par \par \par IMPRESSION: No acute intracranial hemorrhage, mass effect, or shift of the midline structures.\par \par Moderate severity chronic white matter microvascular type changes.

## 2022-06-08 NOTE — PHYSICAL EXAM
[General Appearance - Alert] : alert [General Appearance - Well Nourished] : well nourished [General Appearance - Well-Appearing] : healthy appearing [Oriented to Person] : oriented to person [Person] : oriented to person [Date / Time ___ / 5] : date / time [unfilled] / 5 [Place ___ / 5] : place [unfilled] / 5 [Registration ___ / 3] : registration [unfilled] / 3 [Serial Sevens ___/5] : serial sevens [unfilled] / 5 [Naming 2 Objects ___ / 2] : naming two objects [unfilled] / 2 [Repeating a Sentence ___ / 1] : repeating a sentence [unfilled] / 1 [Writing a Sentence ___ / 1] : write sentence [unfilled] / 1 [3-stage Verbal Command ___ / 3] : three-stage verbal command [unfilled] / 3 [Written Command ___ / 1] : written command [unfilled] / 1 [Copy a Design ___ / 1] : copy a design [unfilled] / 1 [Motor Handedness Right-Handed] : the patient is right hand dominant [Total Score ___ / 30] : the patient achieved a score of [unfilled] /30 [Recall ___ / 3] : recall [unfilled] / 3 [Cranial Nerves Optic (II)] : visual acuity intact bilaterally,  visual fields full to confrontation, pupils equal round and reactive to light [Cranial Nerves Oculomotor (III)] : extraocular motion intact [Cranial Nerves Trigeminal (V)] : facial sensation intact symmetrically [Cranial Nerves Facial (VII)] : face symmetrical [Cranial Nerves Vestibulocochlear (VIII)] : hearing was intact bilaterally [Cranial Nerves Glossopharyngeal (IX)] : tongue and palate midline [Cranial Nerves Accessory (XI - Cranial And Spinal)] : head turning and shoulder shrug symmetric [Cranial Nerves Hypoglossal (XII)] : there was no tongue deviation with protrusion [Sclera] : the sclera and conjunctiva were normal [PERRL With Normal Accommodation] : pupils were equal in size, round, reactive to light, with normal accommodation [Extraocular Movements] : extraocular movements were intact [Full Visual Field] : full visual field [Outer Ear] : the ears and nose were normal in appearance [Hearing Threshold Finger Rub Not Bond] : hearing was normal [Both Tympanic Membranes Were Examined] : both tympanic membranes were normal [Neck Appearance] : the appearance of the neck was normal [Respiration, Rhythm And Depth] : normal respiratory rhythm and effort [No Spinal Tenderness] : no spinal tenderness [Oriented to Place] : disoriented to place [Oriented to Time] : disoriented to time [Place] : disoriented to place [Time] : disoriented to time [FreeTextEntry4] : at baseline would know the month or year, or current events. " wrote sentence but mispelled "communicate ", some words out of order. Kept reading the sentence, perseverating. She gets easily distracted, poor attention.  Recalled 3/3 with hints,  [FreeTextEntry6] : 5/5 throughout UEs, at least 3/5 in the LEs bilat, limited by cooperation with exam.  [Nail Clubbing] : no clubbing  or cyanosis of the fingernails [FreeTextEntry1] : arthritic changes of the MCPS and DIPs [Skin Color & Pigmentation] : normal skin color and pigmentation [] : no rash

## 2022-06-08 NOTE — ASSESSMENT
[FreeTextEntry1] : 82 y/o RH woman hx of HTN, osteoarthritis, gout, brought in by her daughter for episode of shaking and unresponsiveness. This was after prolonged exposure to fumes. Daughter also concerned about progressive memory problems and decreased function in the last few years. CTH from recent ER visit shows atrophy and white matter disease. Her MMSE score is   14/30.  She has poor short term recall, requires repeated prompting to follow commands, a/w confabulation and perseveration. 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 \par Episode of unresponsiveness was most likely convulsive syncope. \par \par - Instructed patient and daughter that with syncope, to lay her down completely flat. \par - Start Donepezil 5mg at night\par - INcrease activity, stimulation, frequent orientation.\par - avoid excessive napping during the day\par - cognitive exercises such as word puzzles, reading, subscription services\par - Follow up with Dr. Gill.

## 2022-06-22 ENCOUNTER — APPOINTMENT (OUTPATIENT)
Dept: CARDIOLOGY | Facility: CLINIC | Age: 82
End: 2022-06-22

## 2022-12-07 ENCOUNTER — APPOINTMENT (OUTPATIENT)
Dept: GASTROENTEROLOGY | Facility: CLINIC | Age: 82
End: 2022-12-07

## 2023-01-06 ENCOUNTER — APPOINTMENT (OUTPATIENT)
Dept: GASTROENTEROLOGY | Facility: CLINIC | Age: 83
End: 2023-01-06
Payer: MEDICARE

## 2023-01-06 VITALS
TEMPERATURE: 97.2 F | BODY MASS INDEX: 29.44 KG/M2 | OXYGEN SATURATION: 98 % | HEIGHT: 62 IN | DIASTOLIC BLOOD PRESSURE: 75 MMHG | WEIGHT: 160 LBS | SYSTOLIC BLOOD PRESSURE: 160 MMHG | HEART RATE: 101 BPM

## 2023-01-06 DIAGNOSIS — Z00.00 ENCOUNTER FOR GENERAL ADULT MEDICAL EXAMINATION W/OUT ABNORMAL FINDINGS: ICD-10-CM

## 2023-01-06 PROCEDURE — 99203 OFFICE O/P NEW LOW 30 MIN: CPT

## 2023-01-06 NOTE — HISTORY OF PRESENT ILLNESS
[FreeTextEntry1] : Lisa Perry is a 82-year-old black female brought by her daughter for evaluation of iron deficiency anemia\par She was referred by her nephrologist to have a gastroenterology consultation\par She denied nausea, vomiting, abdominal pain, change in bowel habits, rectal bleeding or melena any weight loss\par Her appetite has been moderate however she is eating\par She also has age-related aortic stenosis, dementia chronic kidney disease.\par Her hemoglobin is 9.6g with low  iron saturation\par She had colonoscopy and endoscopy more than 10 years ago in her neighborhood medical office

## 2023-01-06 NOTE — PHYSICAL EXAM
[Alert] : alert [Normal Voice/Communication] : normal voice/communication [Healthy Appearing] : healthy appearing [No Acute Distress] : no acute distress [Sclera] : the sclera and conjunctiva were normal [Hearing Threshold Finger Rub Not [place]] : hearing was normal [Normal Lips/Gums] : the lips and gums were normal [Oropharynx] : the oropharynx was normal [Normal Appearance] : the appearance of the neck was normal [No Neck Mass] : no neck mass was observed [No Respiratory Distress] : no respiratory distress [No Acc Muscle Use] : no accessory muscle use [Respiration, Rhythm And Depth] : normal respiratory rhythm and effort [Auscultation Breath Sounds / Voice Sounds] : lungs were clear to auscultation bilaterally [Heart Rate And Rhythm] : heart rate was normal and rhythm regular [Normal S1, S2] : normal S1 and S2 [Murmurs] : no murmurs [None] : no edema [Bowel Sounds] : normal bowel sounds [Abdomen Tenderness] : non-tender [No Masses] : no abdominal mass palpated [Abdomen Soft] : soft [Cervical Lymph Nodes Enlarged Posterior Bilaterally] : no posterior cervical lymphadenopathy [Supraclavicular Lymph Nodes Enlarged Bilaterally] : no supraclavicular lymphadenopathy [Cervical Lymph Nodes Enlarged Anterior Bilaterally] : no anterior cervical lymphadenopathy [No CVA Tenderness] : no CVA  tenderness [No Spinal Tenderness] : no spinal tenderness [Abnormal Walk] : normal gait [No Clubbing, Cyanosis] : no clubbing or cyanosis of the fingernails [Normal Color / Pigmentation] : normal skin color and pigmentation [] : no rash [Oriented To Time, Place, And Person] : oriented to person, place, and time

## 2023-01-11 LAB — HEMOCCULT STL QL IA: NEGATIVE

## 2023-02-27 ENCOUNTER — APPOINTMENT (OUTPATIENT)
Dept: NEUROLOGY | Facility: CLINIC | Age: 83
End: 2023-02-27